# Patient Record
Sex: FEMALE | Race: BLACK OR AFRICAN AMERICAN | NOT HISPANIC OR LATINO | Employment: OTHER | URBAN - METROPOLITAN AREA
[De-identification: names, ages, dates, MRNs, and addresses within clinical notes are randomized per-mention and may not be internally consistent; named-entity substitution may affect disease eponyms.]

---

## 2018-05-15 ENCOUNTER — TELEPHONE (OUTPATIENT)
Dept: FAMILY MEDICINE | Facility: CLINIC | Age: 73
End: 2018-05-15

## 2018-05-15 ENCOUNTER — OFFICE VISIT (OUTPATIENT)
Dept: FAMILY MEDICINE | Facility: CLINIC | Age: 73
End: 2018-05-15
Payer: MEDICARE

## 2018-05-15 VITALS
OXYGEN SATURATION: 99 % | TEMPERATURE: 98 F | SYSTOLIC BLOOD PRESSURE: 110 MMHG | RESPIRATION RATE: 18 BRPM | HEIGHT: 63 IN | WEIGHT: 126.75 LBS | BODY MASS INDEX: 22.46 KG/M2 | HEART RATE: 84 BPM | DIASTOLIC BLOOD PRESSURE: 80 MMHG

## 2018-05-15 DIAGNOSIS — S80.00XA CONTUSION OF KNEE, UNSPECIFIED LATERALITY, INITIAL ENCOUNTER: ICD-10-CM

## 2018-05-15 DIAGNOSIS — S76.212A GROIN STRAIN, LEFT, INITIAL ENCOUNTER: ICD-10-CM
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
## 2018-05-15 PROCEDURE — 99999 PR PBB SHADOW E&M-NEW PATIENT-LVL III: CPT | Mod: PBBFAC,,, | Performed by: REGISTERED NURSE

## 2018-05-15 PROCEDURE — 99204 OFFICE O/P NEW MOD 45 MIN: CPT | Mod: S$GLB,,, | Performed by: REGISTERED NURSE

## 2018-05-15 RX ORDER — NEPAFENAC 3 MG/ML
SUSPENSION/ DROPS OPHTHALMIC
Refills: 1 | COMMUNITY
Start: 2018-05-08 | End: 2023-04-17

## 2018-05-15 RX ORDER — PREDNISOLONE ACETATE 10 MG/ML
SUSPENSION/ DROPS OPHTHALMIC
Refills: 1 | COMMUNITY
Start: 2018-05-08 | End: 2022-10-17

## 2018-05-15 RX ORDER — TIZANIDINE 2 MG/1
4 TABLET ORAL 2 TIMES DAILY PRN
Qty: 30 TABLET | Refills: 0 | Status: SHIPPED | OUTPATIENT
Start: 2018-05-15 | End: 2023-04-17

## 2018-05-15 RX ORDER — BESIFLOXACIN 6 MG/ML
SUSPENSION OPHTHALMIC
Refills: 1 | COMMUNITY
Start: 2018-05-08 | End: 2023-04-17

## 2018-05-15 NOTE — TELEPHONE ENCOUNTER
----- Message from Iqra Fraga sent at 5/15/2018  1:01 PM CDT -----  Contact: self [phone]  Would like to consult with nurse regarding medication; medication is not at pharmacy.  Please call back at [phone]. Md Sofia

## 2018-05-15 NOTE — PROGRESS NOTES
"Subjective:       Patient ID: Ezequiel Vegas is a 73 y.o. female.    Chief Complaint: Fall      HPI    Mrs. Vegas is a new patient to the office today, here with her sister and niece.  Reports a fall at home on Friday 5/11/2018 --- she was getting up, had slippers on which caused her to lose her balance on the wood floor.  She reports "my RT leg went one way and my LT another".  Landed doing something much like that of a split.  She was home alone and not able to get up; stayed on the floor until the next day when her family came to see about her b/c they were not able to get in touch w/ her by phone.  EMS was notified and called to her home, broke in and got her up off the floor.  She was taken to [facility] ED on Saturday 5/12/2018 where she was treated for her fall injury, along with elevated blood pressure likely due to anxiety and given IVF.  Labs and xray results reviewed in Epic.  Adacel vaccine given.  She is now with c/o LT groin strain likely due to the fall and knee pain, abrasion to knee.  Balance has been off for some time, uses walker at home.      Review of Systems   Constitutional: Positive for activity change. Negative for chills and fever.   Eyes: Negative.    Respiratory: Negative.    Cardiovascular: Negative.    Musculoskeletal: Positive for arthralgias (B knees), gait problem and myalgias (LT groin). Negative for back pain, joint swelling, neck pain and neck stiffness.   Skin: Positive for wound.   Neurological: Negative for dizziness, syncope, speech difficulty, light-headedness, numbness and headaches.   Psychiatric/Behavioral: Negative.          Past medical, surgical, family and social histories have been reviewed today.        Objective:       Vitals:    05/15/18 1022   BP: 110/80   Pulse: 84   Resp: 18   Temp: 98.2 °F (36.8 °C)   TempSrc: Oral   SpO2: 99%   Weight: 57.5 kg (126 lb 12.2 oz)   Height: 5' 3" (1.6 m)   PainSc:   8   PainLoc: Generalized       Physical Exam   Constitutional: She is " oriented to person, place, and time. She appears well-developed and well-nourished. No distress.   HENT:   Head: Normocephalic and atraumatic.   Eyes: EOM are normal. Pupils are equal, round, and reactive to light.   Neck: Normal range of motion. Neck supple.   Cardiovascular: Normal rate and regular rhythm.    Pulmonary/Chest: Effort normal and breath sounds normal.   Musculoskeletal: Normal range of motion.        Left hip: She exhibits tenderness. She exhibits normal strength, no swelling and no deformity.        Right knee: She exhibits bony tenderness. She exhibits normal range of motion, no swelling, no ecchymosis, normal alignment and normal patellar mobility. Tenderness found. Medial joint line tenderness noted.        Legs:  Neurological: She is alert and oriented to person, place, and time. No sensory deficit. She exhibits normal muscle tone. Coordination normal.   Skin: Skin is warm and dry. Capillary refill takes less than 2 seconds. Abrasion noted. She is not diaphoretic.        Psychiatric: She has a normal mood and affect. Her behavior is normal. Judgment and thought content normal.   Vitals reviewed.        Medication List with Changes/Refills   Current Medications    BESIVANCE 0.6 % DRPS    INSTILL ONE DROP IN OPERATIVE EYE FOUR TIMES DAILY STARTING THREE DAYS BEFORE SURGERY    ILEVRO 0.3 % DRPS    INSTILL ONE DROP INTO OPERATIVE EYE ONCE DAILY STARTING THREE DAYS BEFORE SURGERY    PREDNISOLONE ACETATE (PRED FORTE) 1 % DRPS    LOCATION: BOTH EYES. INSTILL ONE DROP IN OPERATIVE EYE FOUR TIMES DAILY STARTING AFTER SURGERY           Diagnosis       1. Fall from slipping, initial encounter    2. Contusion of knee, unspecified laterality, initial encounter    3. Groin strain, left, initial encounter          Assessment/ Plan     Fall from slipping, initial encounter        -     PT stable at this time, [facility] ED notes reviewed in Epic.  -     BATH/SHOWER CHAIR FOR HOME USE  -     Safety issues discussed  --- tub bar, anti-slip tub pads, apply tape to bottom of slippers, etc.  -     Family to look into getting PT a Life-Alert bracelet.    Contusion of knee, unspecified laterality, initial encounter        -     Ice, elevate, wound care discussed.    Groin strain, left, initial encounter        -     Ice/heat, rest, gentle ROM/stretching exercises.        -     Cautioned on possible sedation with muscle relaxer.        -     Can try topical pain creams or rubs.        -     Tylenol-XS prn pain.  -     tiZANidine (ZANAFLEX) 2 MG tablet; Take 2 tablets (4 mg total) by mouth 2 (two) times daily as needed.  Dispense: 30 tablet; Refill: 0      Follow-up in clinic as needed.          RIKI Urena  [facility]

## 2018-11-13 ENCOUNTER — PES CALL (OUTPATIENT)
Dept: ADMINISTRATIVE | Facility: CLINIC | Age: 73
End: 2018-11-13

## 2018-11-28 ENCOUNTER — PES CALL (OUTPATIENT)
Dept: ADMINISTRATIVE | Facility: CLINIC | Age: 73
End: 2018-11-28

## 2018-12-04 ENCOUNTER — PES CALL (OUTPATIENT)
Dept: ADMINISTRATIVE | Facility: CLINIC | Age: 73
End: 2018-12-04

## 2019-06-25 ENCOUNTER — OFFICE VISIT (OUTPATIENT)
Dept: FAMILY MEDICINE | Facility: CLINIC | Age: 74
End: 2019-06-25
Payer: MEDICARE

## 2019-06-25 VITALS
SYSTOLIC BLOOD PRESSURE: 119 MMHG | DIASTOLIC BLOOD PRESSURE: 74 MMHG | BODY MASS INDEX: 21.87 KG/M2 | TEMPERATURE: 98 F | HEART RATE: 75 BPM | OXYGEN SATURATION: 96 % | HEIGHT: 63 IN | WEIGHT: 123.44 LBS

## 2019-06-25 DIAGNOSIS — M25.50 PAIN IN JOINT INVOLVING MULTIPLE SITES: ICD-10-CM

## 2019-06-25 DIAGNOSIS — Z00.00 ENCOUNTER FOR MEDICARE ANNUAL WELLNESS EXAM: Primary | ICD-10-CM

## 2019-06-25 DIAGNOSIS — Z91.89 AT RISK FOR BONE DENSITY LOSS: ICD-10-CM

## 2019-06-25 DIAGNOSIS — H61.23 EXCESSIVE CERUMEN IN BOTH EAR CANALS: ICD-10-CM

## 2019-06-25 PROCEDURE — G0439 PPPS, SUBSEQ VISIT: HCPCS | Mod: S$GLB,,, | Performed by: REGISTERED NURSE

## 2019-06-25 PROCEDURE — G0439 PR MEDICARE ANNUAL WELLNESS SUBSEQUENT VISIT: ICD-10-PCS | Mod: S$GLB,,, | Performed by: REGISTERED NURSE

## 2019-06-25 PROCEDURE — 99999 PR PBB SHADOW E&M-EST. PATIENT-LVL III: CPT | Mod: PBBFAC,,, | Performed by: REGISTERED NURSE

## 2019-06-25 PROCEDURE — 99999 PR PBB SHADOW E&M-EST. PATIENT-LVL III: ICD-10-PCS | Mod: PBBFAC,,, | Performed by: REGISTERED NURSE

## 2019-06-25 NOTE — PROGRESS NOTES
Subjective:       Patient ID: Ezequiel Vegas is a 74 y.o. female.    Chief Complaint   Patient presents with    Annual Exam         HPI    Ezequiel Vegas is here today for an annual wellness exam.  I have reviewed the patient's medical history in detail and updated the computerized patient record.      Review of Systems   Constitutional: Negative.    HENT: Positive for ear pain. Negative for congestion, ear discharge, tinnitus, trouble swallowing and voice change.    Eyes: Negative.    Respiratory: Negative.    Cardiovascular: Negative for chest pain, palpitations and leg swelling.   Gastrointestinal: Negative.    Endocrine: Negative for polydipsia, polyphagia and polyuria.   Genitourinary: Negative.    Musculoskeletal: Positive for arthralgias (knees, fingers -- taking Motrin). Negative for back pain, gait problem, neck pain and neck stiffness.   Skin: Negative.    Neurological: Negative.    Hematological: Negative for adenopathy. Does not bruise/bleed easily.   Psychiatric/Behavioral: Positive for sleep disturbance (intermittent, OTC meds have not helped). Negative for decreased concentration and dysphoric mood. The patient is not nervous/anxious.          Review of patient's allergies indicates:   Allergen Reactions    Penicillins      Other reaction(s): Itching       Current Outpatient Medications on File Prior to Visit   Medication Sig Dispense Refill    BESIVANCE 0.6 % DrpS INSTILL ONE DROP IN OPERATIVE EYE FOUR TIMES DAILY STARTING THREE DAYS BEFORE SURGERY  1    ILEVRO 0.3 % DrpS INSTILL ONE DROP INTO OPERATIVE EYE ONCE DAILY STARTING THREE DAYS BEFORE SURGERY  1    prednisoLONE acetate (PRED FORTE) 1 % DrpS LOCATION: BOTH EYES. INSTILL ONE DROP IN OPERATIVE EYE FOUR TIMES DAILY STARTING AFTER SURGERY  1    tiZANidine (ZANAFLEX) 2 MG tablet Take 2 tablets (4 mg total) by mouth 2 (two) times daily as needed. 30 tablet 0     No current facility-administered medications on file prior to visit.   "      There is no problem list on file for this patient.      Past Medical History:   Diagnosis Date    Allergy     Cataract     Gout        Past Surgical History:   Procedure Laterality Date    CATARACT EXTRACTION Right 05/10/2018    HYSTERECTOMY      NEL/BSO    VARICOSE VEIN SURGERY         Family History   Problem Relation Age of Onset    Cervical cancer Mother     Glaucoma Mother     Esophageal cancer Mother     Hypertension Mother     No Known Problems Father     Prostate cancer Maternal Uncle     Lupus Other     Kidney disease Other     Stroke Neg Hx     Diabetes Neg Hx        Social History     Socioeconomic History    Marital status:     Number of children: 0   Tobacco Use    Smoking status: Light Tobacco Smoker    Smokeless tobacco: Never Used    Tobacco comment: occasionally   Substance and Sexual Activity    Alcohol use: Yes     Comment: occasional    Drug use: No    Sexual activity: Never             Objective:     Vitals:    06/25/19 1110   BP: 119/74   Pulse: 75   Temp: 97.7 °F (36.5 °C)   SpO2: 96%   Weight: 56 kg (123 lb 7.3 oz)   Height: 5' 3" (1.6 m)   PainSc: 0-No pain         Physical Exam   Constitutional: She is oriented to person, place, and time. She appears well-developed and well-nourished. No distress.   HENT:   Head: Normocephalic.   Right Ear: External ear normal. There is drainage (both canals occluded with wax).   Left Ear: External ear normal. There is drainage.   Nose: Nose normal.   Mouth/Throat: Oropharynx is clear and moist. No oropharyngeal exudate.   Eyes: Pupils are equal, round, and reactive to light. Conjunctivae and EOM are normal. Right eye exhibits no discharge. Left eye exhibits no discharge. No scleral icterus.   Neck: Normal range of motion. Neck supple. No JVD present. No tracheal deviation present. No thyromegaly present.   Cardiovascular: Normal rate, regular rhythm, normal heart sounds and intact distal pulses. Exam reveals no gallop " and no friction rub.   No murmur heard.  Pulmonary/Chest: Effort normal and breath sounds normal. No respiratory distress. She has no wheezes. She has no rales. She exhibits no tenderness.   Abdominal: Soft. Bowel sounds are normal. She exhibits no distension and no mass. There is no tenderness.   Musculoskeletal: Normal range of motion. She exhibits tenderness (finger joints//mild). She exhibits no edema or deformity.   Lymphadenopathy:     She has no cervical adenopathy.   Neurological: She is alert and oriented to person, place, and time. She displays normal reflexes. No cranial nerve deficit or sensory deficit. She exhibits normal muscle tone. Coordination normal.   Skin: Skin is warm and dry. Capillary refill takes less than 2 seconds. No rash noted. She is not diaphoretic.   Psychiatric: She has a normal mood and affect. Her behavior is normal. Judgment and thought content normal.         Diagnosis       1. Encounter for Medicare annual wellness exam    2. Pain in joint involving multiple sites    3. Excessive cerumen in both ear canals    4. At risk for bone density loss          Assessment/ Plan     Encounter for Medicare annual wellness exam  -     CBC auto differential; Future; Expected date: 06/25/2019  -     Comprehensive metabolic panel; Future; Expected date: 06/25/2019  -     TSH; Future; Expected date: 06/25/2019  -     Lipid panel; Future; Expected date: 06/25/2019    Pain in joint involving multiple sites  · With reports of intermittent joint pain, stiffness and swelling to knees and fingers, highly likely due to arthritis.  · Ice, elevate, exercises.  · OTC pain med prn.    Excessive cerumen in both ear canals  · Debrox prn.  · Avoid q-tips for ear cleaning.    At risk for bone density loss  -     DXA Bone Density Spine And Hip; Future; Expected date: 06/25/2019        Healthy diet, exercise.  Lab pending.  Follow-up in clinic as needed.          RIKI Urena  [facility]

## 2019-07-01 ENCOUNTER — LAB VISIT (OUTPATIENT)
Dept: LAB | Facility: HOSPITAL | Age: 74
End: 2019-07-01
Attending: REGISTERED NURSE
Payer: MEDICARE

## 2019-07-01 DIAGNOSIS — Z00.00 ENCOUNTER FOR MEDICARE ANNUAL WELLNESS EXAM: ICD-10-CM

## 2019-07-01 LAB
ALBUMIN SERPL BCP-MCNC: 3.6 G/DL (ref 3.5–5.2)
ALP SERPL-CCNC: 58 U/L (ref 55–135)
ALT SERPL W/O P-5'-P-CCNC: 11 U/L (ref 10–44)
ANION GAP SERPL CALC-SCNC: 12 MMOL/L (ref 8–16)
AST SERPL-CCNC: 17 U/L (ref 10–40)
BASOPHILS # BLD AUTO: 0.06 K/UL (ref 0–0.2)
BASOPHILS NFR BLD: 0.5 % (ref 0–1.9)
BILIRUB SERPL-MCNC: 0.8 MG/DL (ref 0.1–1)
BUN SERPL-MCNC: 20 MG/DL (ref 8–23)
CALCIUM SERPL-MCNC: 9.8 MG/DL (ref 8.7–10.5)
CHLORIDE SERPL-SCNC: 105 MMOL/L (ref 95–110)
CHOLEST SERPL-MCNC: 176 MG/DL (ref 120–199)
CHOLEST/HDLC SERPL: 2.5 {RATIO} (ref 2–5)
CO2 SERPL-SCNC: 25 MMOL/L (ref 23–29)
CREAT SERPL-MCNC: 1.2 MG/DL (ref 0.5–1.4)
DIFFERENTIAL METHOD: ABNORMAL
EOSINOPHIL # BLD AUTO: 0.3 K/UL (ref 0–0.5)
EOSINOPHIL NFR BLD: 2.9 % (ref 0–8)
ERYTHROCYTE [DISTWIDTH] IN BLOOD BY AUTOMATED COUNT: 14.7 % (ref 11.5–14.5)
EST. GFR  (AFRICAN AMERICAN): 50.7 ML/MIN/1.73 M^2
EST. GFR  (NON AFRICAN AMERICAN): 44 ML/MIN/1.73 M^2
GLUCOSE SERPL-MCNC: 78 MG/DL (ref 70–110)
HCT VFR BLD AUTO: 42.5 % (ref 37–48.5)
HDLC SERPL-MCNC: 70 MG/DL (ref 40–75)
HDLC SERPL: 39.8 % (ref 20–50)
HGB BLD-MCNC: 13.8 G/DL (ref 12–16)
IMM GRANULOCYTES # BLD AUTO: 0.03 K/UL (ref 0–0.04)
IMM GRANULOCYTES NFR BLD AUTO: 0.3 % (ref 0–0.5)
LDLC SERPL CALC-MCNC: 89.2 MG/DL (ref 63–159)
LYMPHOCYTES # BLD AUTO: 2.1 K/UL (ref 1–4.8)
LYMPHOCYTES NFR BLD: 18.3 % (ref 18–48)
MCH RBC QN AUTO: 31.1 PG (ref 27–31)
MCHC RBC AUTO-ENTMCNC: 32.5 G/DL (ref 32–36)
MCV RBC AUTO: 96 FL (ref 82–98)
MONOCYTES # BLD AUTO: 0.7 K/UL (ref 0.3–1)
MONOCYTES NFR BLD: 5.8 % (ref 4–15)
NEUTROPHILS # BLD AUTO: 8.2 K/UL (ref 1.8–7.7)
NEUTROPHILS NFR BLD: 72.2 % (ref 38–73)
NONHDLC SERPL-MCNC: 106 MG/DL
NRBC BLD-RTO: 0 /100 WBC
PLATELET # BLD AUTO: 241 K/UL (ref 150–350)
PMV BLD AUTO: 12.1 FL (ref 9.2–12.9)
POTASSIUM SERPL-SCNC: 4.1 MMOL/L (ref 3.5–5.1)
PROT SERPL-MCNC: 7.3 G/DL (ref 6–8.4)
RBC # BLD AUTO: 4.44 M/UL (ref 4–5.4)
SODIUM SERPL-SCNC: 142 MMOL/L (ref 136–145)
TRIGL SERPL-MCNC: 84 MG/DL (ref 30–150)
TSH SERPL DL<=0.005 MIU/L-ACNC: 1.93 UIU/ML (ref 0.4–4)
WBC # BLD AUTO: 11.29 K/UL (ref 3.9–12.7)

## 2019-07-01 PROCEDURE — 36415 COLL VENOUS BLD VENIPUNCTURE: CPT | Mod: PO

## 2019-07-01 PROCEDURE — 80053 COMPREHEN METABOLIC PANEL: CPT

## 2019-07-01 PROCEDURE — 84443 ASSAY THYROID STIM HORMONE: CPT

## 2019-07-01 PROCEDURE — 85025 COMPLETE CBC W/AUTO DIFF WBC: CPT

## 2019-07-01 PROCEDURE — 80061 LIPID PANEL: CPT

## 2019-07-08 ENCOUNTER — TELEPHONE (OUTPATIENT)
Dept: FAMILY MEDICINE | Facility: CLINIC | Age: 74
End: 2019-07-08

## 2019-07-08 DIAGNOSIS — N18.30 CKD (CHRONIC KIDNEY DISEASE) STAGE 3, GFR 30-59 ML/MIN: ICD-10-CM

## 2019-07-08 NOTE — TELEPHONE ENCOUNTER
Labs reviewed ---- everything looks fine/stable except for a slight decrease in her kidney function.  This can be seen with the normal aging process.  Will continue to monitor, recheck in 3 months.

## 2019-11-11 ENCOUNTER — PES CALL (OUTPATIENT)
Dept: ADMINISTRATIVE | Facility: CLINIC | Age: 74
End: 2019-11-11

## 2020-01-17 ENCOUNTER — PATIENT OUTREACH (OUTPATIENT)
Dept: ADMINISTRATIVE | Facility: HOSPITAL | Age: 75
End: 2020-01-17

## 2020-01-17 NOTE — PROGRESS NOTES
PreVisit Chart Audit Perfomed         Cristina CRAMER LPN Care Coordinator  Care Coordination Department  [facility]  [phone]

## 2020-01-31 ENCOUNTER — OFFICE VISIT (OUTPATIENT)
Dept: FAMILY MEDICINE | Facility: CLINIC | Age: 75
End: 2020-01-31
Payer: MEDICARE

## 2020-01-31 VITALS
TEMPERATURE: 98 F | WEIGHT: 124.56 LBS | HEIGHT: 63 IN | SYSTOLIC BLOOD PRESSURE: 140 MMHG | HEART RATE: 79 BPM | BODY MASS INDEX: 22.07 KG/M2 | DIASTOLIC BLOOD PRESSURE: 90 MMHG | OXYGEN SATURATION: 99 % | RESPIRATION RATE: 18 BRPM

## 2020-01-31 DIAGNOSIS — R03.0 ELEVATED BLOOD PRESSURE READING: ICD-10-CM

## 2020-01-31 DIAGNOSIS — G47.00 INSOMNIA, UNSPECIFIED TYPE: Primary | ICD-10-CM

## 2020-01-31 DIAGNOSIS — R53.83 FATIGUE, UNSPECIFIED TYPE: ICD-10-CM

## 2020-01-31 PROCEDURE — 1126F PR PAIN SEVERITY QUANTIFIED, NO PAIN PRESENT: ICD-10-PCS | Mod: S$GLB,,, | Performed by: REGISTERED NURSE

## 2020-01-31 PROCEDURE — 1159F PR MEDICATION LIST DOCUMENTED IN MEDICAL RECORD: ICD-10-PCS | Mod: S$GLB,,, | Performed by: REGISTERED NURSE

## 2020-01-31 PROCEDURE — 99999 PR PBB SHADOW E&M-EST. PATIENT-LVL III: CPT | Mod: PBBFAC,,, | Performed by: REGISTERED NURSE

## 2020-01-31 PROCEDURE — 90662 FLU VACCINE - HIGH DOSE (65+) PRESERVATIVE FREE IM: ICD-10-PCS | Mod: S$GLB,,, | Performed by: REGISTERED NURSE

## 2020-01-31 PROCEDURE — G0008 FLU VACCINE - HIGH DOSE (65+) PRESERVATIVE FREE IM: ICD-10-PCS | Mod: S$GLB,,, | Performed by: REGISTERED NURSE

## 2020-01-31 PROCEDURE — 1101F PT FALLS ASSESS-DOCD LE1/YR: CPT | Mod: S$GLB,,, | Performed by: REGISTERED NURSE

## 2020-01-31 PROCEDURE — 1159F MED LIST DOCD IN RCRD: CPT | Mod: S$GLB,,, | Performed by: REGISTERED NURSE

## 2020-01-31 PROCEDURE — 1126F AMNT PAIN NOTED NONE PRSNT: CPT | Mod: S$GLB,,, | Performed by: REGISTERED NURSE

## 2020-01-31 PROCEDURE — 99999 PR PBB SHADOW E&M-EST. PATIENT-LVL III: ICD-10-PCS | Mod: PBBFAC,,, | Performed by: REGISTERED NURSE

## 2020-01-31 PROCEDURE — 1101F PR PT FALLS ASSESS DOC 0-1 FALLS W/OUT INJ PAST YR: ICD-10-PCS | Mod: S$GLB,,, | Performed by: REGISTERED NURSE

## 2020-01-31 PROCEDURE — 90662 IIV NO PRSV INCREASED AG IM: CPT | Mod: S$GLB,,, | Performed by: REGISTERED NURSE

## 2020-01-31 PROCEDURE — 99213 OFFICE O/P EST LOW 20 MIN: CPT | Mod: 25,S$GLB,, | Performed by: REGISTERED NURSE

## 2020-01-31 PROCEDURE — G0008 ADMIN INFLUENZA VIRUS VAC: HCPCS | Mod: S$GLB,,, | Performed by: REGISTERED NURSE

## 2020-01-31 PROCEDURE — 99213 PR OFFICE/OUTPT VISIT, EST, LEVL III, 20-29 MIN: ICD-10-PCS | Mod: 25,S$GLB,, | Performed by: REGISTERED NURSE

## 2020-01-31 RX ORDER — TRAZODONE HYDROCHLORIDE 50 MG/1
25 TABLET ORAL NIGHTLY PRN
Qty: 30 TABLET | Refills: 1 | Status: SHIPPED | OUTPATIENT
Start: 2020-01-31 | End: 2022-10-13

## 2020-01-31 NOTE — PROGRESS NOTES
"Subjective:       Patient ID: Ezequiel Vegas is a 74 y.o. female.    Chief Complaint   Patient presents with    Fatigue    Insomnia       HPI    Ezequiel Vegas is here today with c/o increased fatigue likely due to her not sleeping.  She reports "[place] very nice neighborhood", neighbors are very noisy and disruptive.  This goes on at all hours of day and night.  She is afraid to notify anyone in case they retaliate.  She has tried taking OTC sleep medication but has not helped.  Does plan on moving soon.  Does fall asleep fine but tosses/turns all night due to the noise, wakes up frequently.        BP Readings from Last 3 Encounters:   01/31/20 (!) 144/92   06/25/19 119/74   05/15/18 110/80         Review of Systems   Constitutional: Positive for fatigue.   Respiratory: Negative.    Cardiovascular: Negative.    Neurological: Negative.    Psychiatric/Behavioral: Positive for sleep disturbance.         Review of patient's allergies indicates:   Allergen Reactions    Penicillins      Other reaction(s): Itching         Patient Active Problem List   Diagnosis    CKD (chronic kidney disease) stage 3, GFR 30-59 ml/min       Medication List with Changes/Refills   Current Medications    BESIVANCE 0.6 % DRPS    INSTILL ONE DROP IN OPERATIVE EYE FOUR TIMES DAILY STARTING THREE DAYS BEFORE SURGERY    ILEVRO 0.3 % DRPS    INSTILL ONE DROP INTO OPERATIVE EYE ONCE DAILY STARTING THREE DAYS BEFORE SURGERY    PREDNISOLONE ACETATE (PRED FORTE) 1 % DRPS    LOCATION: BOTH EYES. INSTILL ONE DROP IN OPERATIVE EYE FOUR TIMES DAILY STARTING AFTER SURGERY    TIZANIDINE (ZANAFLEX) 2 MG TABLET    Take 2 tablets (4 mg total) by mouth 2 (two) times daily as needed.             Past medical, surgical, family and social histories have been reviewed today.        Objective:     Vitals:    01/31/20 0939   BP: (!) 144/92   Pulse: 79   Resp: 18   Temp: 97.9 °F (36.6 °C)   TempSrc: Oral   SpO2: 99%   Weight: 56.5 kg (124 lb 9 oz) " "  Height: 5' 3" (1.6 m)   PainSc: 0-No pain       Estimated body mass index is 22.06 kg/m² as calculated from the following:    Height as of this encounter: 5' 3" (1.6 m).    Weight as of this encounter: 56.5 kg (124 lb 9 oz).      Physical Exam   Constitutional: She is oriented to person, place, and time. She appears well-developed and well-nourished.   Cardiovascular: Normal rate and regular rhythm.   Pulmonary/Chest: Effort normal and breath sounds normal.   Neurological: She is alert and oriented to person, place, and time.   Vitals reviewed.        Diagnosis       1. Insomnia, unspecified type    2. Fatigue, unspecified type    3. Elevated blood pressure reading          Assessment/ Plan     Insomnia, unspecified type  -     traZODone (DESYREL) 50 MG tablet; Take 0.5 tablets (25 mg total) by mouth nightly as needed for Insomnia.  Dispense: 30 tablet; Refill: 1    Fatigue, unspecified type  Likely a direct result of interrupted sleep.    Elevated blood pressure reading  Possibly due to lack of sleep, no h/o HTN.  Return in 1 to 2 weeks with nurse to monitor.    Other orders  -     Influenza - High Dose (65+) (PF) (IM)        Flu shot today.  Medication discussed.  Follow-up in clinic as needed.      Patient Care Team:  Primary [facility]   "

## 2020-10-09 ENCOUNTER — PATIENT OUTREACH (OUTPATIENT)
Dept: ADMINISTRATIVE | Facility: HOSPITAL | Age: 75
End: 2020-10-09

## 2020-10-09 NOTE — PROGRESS NOTES
HTN REPORT: Attempting to contact pt to schedule Annual F/U. Unable to reach patient at this time. Left voicemail.

## 2020-11-19 ENCOUNTER — PES CALL (OUTPATIENT)
Dept: ADMINISTRATIVE | Facility: CLINIC | Age: 75
End: 2020-11-19

## 2020-11-20 ENCOUNTER — IMMUNIZATION (OUTPATIENT)
Dept: FAMILY MEDICINE | Facility: CLINIC | Age: 75
End: 2020-11-20
Payer: MEDICARE

## 2020-11-20 PROCEDURE — G0008 ADMIN INFLUENZA VIRUS VAC: HCPCS | Mod: S$GLB,,, | Performed by: FAMILY MEDICINE

## 2020-11-20 PROCEDURE — G0008 FLU VACCINE - QUADRIVALENT - ADJUVANTED: ICD-10-PCS | Mod: S$GLB,,, | Performed by: FAMILY MEDICINE

## 2020-11-20 PROCEDURE — 90694 VACC AIIV4 NO PRSRV 0.5ML IM: CPT | Mod: S$GLB,,, | Performed by: FAMILY MEDICINE

## 2020-11-20 PROCEDURE — 90694 FLU VACCINE - QUADRIVALENT - ADJUVANTED: ICD-10-PCS | Mod: S$GLB,,, | Performed by: FAMILY MEDICINE

## 2020-11-23 ENCOUNTER — PES CALL (OUTPATIENT)
Dept: ADMINISTRATIVE | Facility: CLINIC | Age: 75
End: 2020-11-23

## 2020-12-10 NOTE — PROGRESS NOTES
Subjective:       Patient ID: Ezequiel Vegas is a 75 y.o. female.    Chief Complaint   Patient presents with    Annual Exam         HPI    Ezequiel Vegas is here today for an annual wellness exam.  I have reviewed the patient's medical history in detail and updated the computerized patient record.      HEALTH MAINTENANCE:  [x]  Mammogram: overdue  [x]  Colonoscopy:  Unclear as to date last done and when next one due  []  Pap smear: Hysterectomy  []  DEXA: > 3 yrs, declines  [x]  Eye exam:  2019  []  Foot exam: n/a  [x]  Labwork: [x]  Lipids: 7/1/19  [x]  A1C: never  [x]  HIV: never  [x]  Hepatitis-C: never  [x]  Vaccines: []  Tetanus: 5/12/18  [x]  Shingles: to pharmacy  [x]  Pneumonia: never  []  Influenza: 11/20/20      LIFESTYLE:  Diet:  []  Unhealthy overall  []  Vegan/plant-based  []  Pescatarian  [x]  Well-balanced  []  High in fat and cholesterol  []  Increased carbs/starches  []  Fiber    Exercise: [x]  None   []  Type:  []  Minutes:  []  Distance:  []  Days per week:    Water:  []  None  []  Minimal  [x]  Adequate  []  Ounces/day    Salt:  [x]  None  []  Low  []  Cooks with  []  Adds at table  []  Increased overall    Caffeine: []  None  [x]  Coffee  []  Tea  [x]  Soft drinks    Smoking: []  None    []  Former smoker    [x]  PPD: 1/4 ppd    Alcohol: []  None    [x]  Yes --- ana [place] 1 to 2 times/week    Weight: []  Stable    []  Gain    [x]  Loss --- 11 lbs since 1/2020, decreased appetite    Stress:  [x]  None reported    []  Mild    []  Moderate    []  Severe        Review of Systems   Constitutional: Negative for activity change, appetite change, chills, diaphoresis, fatigue, fever and unexpected weight change.   HENT: Negative for nasal congestion, mouth sores and tinnitus.         Reports ear pain/pressure   Eyes: Negative for discharge and visual disturbance.   Respiratory: Negative for cough, shortness of breath and wheezing.    Cardiovascular: Negative for chest pain, palpitations  and leg swelling.   Gastrointestinal: Positive for constipation (BM about 1 to 2 times/week, lots of water reported). Negative for abdominal pain, bloating, blood in stool, diarrhea, nausea, vomiting and reflux.   Endocrine: Negative.    Genitourinary: Negative.    Musculoskeletal: Positive for arthralgias.   Integumentary:  Negative for rash. Negative.   Neurological: Negative for vertigo, seizures, syncope, numbness and headaches.   Hematological: Negative.    Psychiatric/Behavioral: Negative for depression and sleep disturbance. The patient is not nervous/anxious.    Breast: negative.           Review of patient's allergies indicates:   Allergen Reactions    Penicillins      Other reaction(s): Itching         Patient Active Problem List   Diagnosis    CKD (chronic kidney disease) stage 3, GFR 30-59 ml/min           Current Outpatient Medications:     BESIVANCE 0.6 % DrpS, INSTILL ONE DROP IN OPERATIVE EYE FOUR TIMES DAILY STARTING THREE DAYS BEFORE SURGERY, Disp: , Rfl: 1    ILEVRO 0.3 % DrpS, INSTILL ONE DROP INTO OPERATIVE EYE ONCE DAILY STARTING THREE DAYS BEFORE SURGERY, Disp: , Rfl: 1    prednisoLONE acetate (PRED FORTE) 1 % DrpS, LOCATION: BOTH EYES. INSTILL ONE DROP IN OPERATIVE EYE FOUR TIMES DAILY STARTING AFTER SURGERY, Disp: , Rfl: 1    tiZANidine (ZANAFLEX) 2 MG tablet, Take 2 tablets (4 mg total) by mouth 2 (two) times daily as needed., Disp: 30 tablet, Rfl: 0    traZODone (DESYREL) 50 MG tablet, Take 0.5 tablets (25 mg total) by mouth nightly as needed for Insomnia., Disp: 30 tablet, Rfl: 1      Past Medical History:   Diagnosis Date    Allergy     Cataract     Gout          Past Surgical History:   Procedure Laterality Date    CATARACT EXTRACTION Right 05/10/2018    HYSTERECTOMY      NEL/BSO    VARICOSE VEIN SURGERY           Family History   Problem Relation Age of Onset    Cervical cancer Mother     Glaucoma Mother     Esophageal cancer Mother     Hypertension Mother     No  "Known Problems Father     Prostate cancer Maternal Uncle     Lupus Other     Kidney disease Other     Stroke Neg Hx     Diabetes Neg Hx          Social History     Tobacco Use    Smoking status: Light Tobacco Smoker    Smokeless tobacco: Never Used    Tobacco comment: occasionally   Substance Use Topics    Alcohol use: Yes     Comment: occasional    Drug use: No           Objective:     Vitals:    12/11/20 1013   BP: 134/84   BP Location: Right arm   Patient Position: Sitting   BP Method: Medium (Manual)   Pulse: 65   Temp: 97.6 °F (36.4 °C)   TempSrc: Temporal   SpO2: 100%   Weight: 51.3 kg (113 lb 1.5 oz)   Height: 5' 3" (1.6 m)       Estimated body mass index is 22.06 kg/m² as calculated from the following:    Height as of 1/31/20: 5' 3" (1.6 m).    Weight as of 1/31/20: 56.5 kg (124 lb 9 oz).      Physical Exam  Constitutional:       General: She is not in acute distress.     Appearance: She is well-developed. She is not diaphoretic.   HENT:      Head: Normocephalic and atraumatic.      Right Ear: Tympanic membrane, ear canal and external ear normal. There is no impacted cerumen.      Left Ear: Tympanic membrane, ear canal and external ear normal. There is no impacted cerumen.      Nose: Nose normal. No nasal deformity, mucosal edema, congestion, rhinorrhea or epistaxis.      Mouth/Throat:      Mouth: Mucous membranes are moist. Mucous membranes are not dry and not cyanotic. No oral lesions.      Dentition: Normal dentition.      Pharynx: Oropharynx is clear. Uvula midline. No oropharyngeal exudate, posterior oropharyngeal erythema or uvula swelling.      Tonsils: No tonsillar abscesses.   Eyes:      General: Lids are normal. Lids are everted, no foreign bodies appreciated. No scleral icterus.        Right eye: No discharge.         Left eye: No discharge.      Conjunctiva/sclera: Conjunctivae normal.      Right eye: Right conjunctiva is not injected. No exudate.     Left eye: Left conjunctiva is not " injected. No exudate.     Pupils: Pupils are equal, round, and reactive to light.   Neck:      Musculoskeletal: Normal range of motion and neck supple. Normal range of motion. No edema or erythema.      Thyroid: No thyroid mass or thyromegaly.      Vascular: No carotid bruit or JVD.      Trachea: No tracheal deviation.   Cardiovascular:      Rate and Rhythm: Normal rate and regular rhythm.      Pulses: Normal pulses.      Heart sounds: Normal heart sounds. No murmur. No friction rub. No gallop.    Pulmonary:      Effort: Pulmonary effort is normal. No respiratory distress.      Breath sounds: Normal breath sounds. No stridor. No wheezing.   Chest:      Chest wall: No tenderness.   Abdominal:      General: Bowel sounds are normal. There is no distension.      Palpations: Abdomen is soft. There is no mass.      Tenderness: There is no abdominal tenderness. There is no guarding or rebound.   Musculoskeletal: Normal range of motion.         General: No swelling, tenderness or deformity.   Lymphadenopathy:      Cervical: No cervical adenopathy.   Skin:     General: Skin is warm and dry.      Capillary Refill: Capillary refill takes less than 2 seconds.      Coloration: Skin is not pale.      Findings: No bruising, erythema or rash.   Neurological:      Mental Status: She is alert and oriented to person, place, and time.      Cranial Nerves: No cranial nerve deficit.      Sensory: No sensory deficit.      Motor: No weakness, tremor, atrophy or abnormal muscle tone.      Coordination: Coordination normal.      Gait: Gait normal.      Deep Tendon Reflexes: Reflexes are normal and symmetric.   Psychiatric:         Mood and Affect: Mood normal.         Speech: Speech normal.         Behavior: Behavior normal.         Thought Content: Thought content normal.         Cognition and Memory: Memory is not impaired.         Judgment: Judgment normal.           Diagnosis       1. Encounter for Medicare annual wellness exam    2.  Stage 3 chronic kidney disease, unspecified whether stage 3a or 3b CKD    3. Bilateral impacted cerumen    4. Colon cancer screening    5. Encounter for screening for malignant neoplasm of breast, unspecified screening modality    6. Need for pneumococcal vaccine          Assessment/ Plan     Encounter for Medicare annual wellness exam --- HCM discussed.  -     CBC Auto Differential; Future; Expected date: 12/11/2020  -     Comprehensive Metabolic Panel; Future; Expected date: 12/11/2020  -     TSH; Future; Expected date: 12/11/2020  -     Lipid Panel; Future; Expected date: 12/11/2020  -     Hemoglobin A1C; Future; Expected date: 12/11/2020  -     HIV 1/2 Ag/Ab (4th Gen); Future; Expected date: 12/11/2020  -     Hepatitis C Antibody; Future; Expected date: 12/11/2020  -     Mammo Digital Screening Bilat w/ Adria; Future; Expected date: 12/11/2020  -     Pneumococcal Conjugate Vaccine (13 Valent) (IM)  -     Fecal Immunochemical Test (iFOBT); Future; Expected date: 12/11/2020    Stage 3 chronic kidney disease, unspecified whether stage 3a or 3b CKD  -     CBC Auto Differential; Future; Expected date: 12/11/2020  -     Comprehensive Metabolic Panel; Future; Expected date: 12/11/2020    Bilateral impacted cerumen  --- Wax is removed by syringing and manual debridement. Instructions for home care to prevent wax buildup are given.  Debrox prn.  TM visualized, normal.    Colon cancer screening --- if test results negative, then can likely stop screening due to age.  -     Fecal Immunochemical Test (iFOBT); Future; Expected date: 12/11/2020    Encounter for screening for malignant neoplasm of breast, unspecified screening modality  -     Mammo Digital Screening Bilat w/ Adria; Future; Expected date: 12/11/2020    Need for pneumococcal vaccine --- Pneumovax due 1 yr  -     Pneumococcal Conjugate Vaccine (13 Valent) (IM)          FOLLOW-UP:  Return prn.      Patient Care Team:  Primary Doctor No as PCP - General Jenkins  RIKI Craig  [facility]

## 2020-12-11 ENCOUNTER — LAB VISIT (OUTPATIENT)
Dept: LAB | Facility: HOSPITAL | Age: 75
End: 2020-12-11
Attending: REGISTERED NURSE
Payer: MEDICARE

## 2020-12-11 ENCOUNTER — OFFICE VISIT (OUTPATIENT)
Dept: FAMILY MEDICINE | Facility: CLINIC | Age: 75
End: 2020-12-11
Payer: MEDICARE

## 2020-12-11 VITALS
TEMPERATURE: 98 F | SYSTOLIC BLOOD PRESSURE: 134 MMHG | DIASTOLIC BLOOD PRESSURE: 84 MMHG | BODY MASS INDEX: 20.04 KG/M2 | HEIGHT: 63 IN | OXYGEN SATURATION: 100 % | WEIGHT: 113.13 LBS | HEART RATE: 65 BPM

## 2020-12-11 DIAGNOSIS — H61.23 BILATERAL IMPACTED CERUMEN: ICD-10-CM

## 2020-12-11 DIAGNOSIS — N18.30 STAGE 3 CHRONIC KIDNEY DISEASE, UNSPECIFIED WHETHER STAGE 3A OR 3B CKD: ICD-10-CM

## 2020-12-11 DIAGNOSIS — Z12.39 ENCOUNTER FOR SCREENING FOR MALIGNANT NEOPLASM OF BREAST, UNSPECIFIED SCREENING MODALITY: ICD-10-CM

## 2020-12-11 DIAGNOSIS — Z23 NEED FOR PNEUMOCOCCAL VACCINE: ICD-10-CM

## 2020-12-11 DIAGNOSIS — Z12.11 COLON CANCER SCREENING: ICD-10-CM

## 2020-12-11 DIAGNOSIS — Z00.00 ENCOUNTER FOR MEDICARE ANNUAL WELLNESS EXAM: ICD-10-CM

## 2020-12-11 DIAGNOSIS — Z00.00 ENCOUNTER FOR MEDICARE ANNUAL WELLNESS EXAM: Primary | ICD-10-CM

## 2020-12-11 LAB
ALBUMIN SERPL BCP-MCNC: 4 G/DL (ref 3.5–5.2)
ALP SERPL-CCNC: 59 U/L (ref 55–135)
ALT SERPL W/O P-5'-P-CCNC: 12 U/L (ref 10–44)
ANION GAP SERPL CALC-SCNC: 11 MMOL/L (ref 8–16)
AST SERPL-CCNC: 21 U/L (ref 10–40)
BASOPHILS # BLD AUTO: 0.04 K/UL (ref 0–0.2)
BASOPHILS NFR BLD: 0.4 % (ref 0–1.9)
BILIRUB SERPL-MCNC: 0.6 MG/DL (ref 0.1–1)
BUN SERPL-MCNC: 19 MG/DL (ref 8–23)
CALCIUM SERPL-MCNC: 9.5 MG/DL (ref 8.7–10.5)
CHLORIDE SERPL-SCNC: 104 MMOL/L (ref 95–110)
CHOLEST SERPL-MCNC: 225 MG/DL (ref 120–199)
CHOLEST/HDLC SERPL: 3 {RATIO} (ref 2–5)
CO2 SERPL-SCNC: 27 MMOL/L (ref 23–29)
CREAT SERPL-MCNC: 1.2 MG/DL (ref 0.5–1.4)
DIFFERENTIAL METHOD: ABNORMAL
EOSINOPHIL # BLD AUTO: 0.2 K/UL (ref 0–0.5)
EOSINOPHIL NFR BLD: 1.8 % (ref 0–8)
ERYTHROCYTE [DISTWIDTH] IN BLOOD BY AUTOMATED COUNT: 15.5 % (ref 11.5–14.5)
EST. GFR  (AFRICAN AMERICAN): 50.4 ML/MIN/1.73 M^2
EST. GFR  (NON AFRICAN AMERICAN): 43.7 ML/MIN/1.73 M^2
ESTIMATED AVG GLUCOSE: 103 MG/DL (ref 68–131)
GLUCOSE SERPL-MCNC: 84 MG/DL (ref 70–110)
HBA1C MFR BLD HPLC: 5.2 % (ref 4–5.6)
HCT VFR BLD AUTO: 42.1 % (ref 37–48.5)
HDLC SERPL-MCNC: 76 MG/DL (ref 40–75)
HDLC SERPL: 33.8 % (ref 20–50)
HGB BLD-MCNC: 12.9 G/DL (ref 12–16)
IMM GRANULOCYTES # BLD AUTO: 0.03 K/UL (ref 0–0.04)
IMM GRANULOCYTES NFR BLD AUTO: 0.3 % (ref 0–0.5)
LDLC SERPL CALC-MCNC: 131 MG/DL (ref 63–159)
LYMPHOCYTES # BLD AUTO: 2 K/UL (ref 1–4.8)
LYMPHOCYTES NFR BLD: 20 % (ref 18–48)
MCH RBC QN AUTO: 28.9 PG (ref 27–31)
MCHC RBC AUTO-ENTMCNC: 30.6 G/DL (ref 32–36)
MCV RBC AUTO: 94 FL (ref 82–98)
MONOCYTES # BLD AUTO: 0.5 K/UL (ref 0.3–1)
MONOCYTES NFR BLD: 5.2 % (ref 4–15)
NEUTROPHILS # BLD AUTO: 7.3 K/UL (ref 1.8–7.7)
NEUTROPHILS NFR BLD: 72.3 % (ref 38–73)
NONHDLC SERPL-MCNC: 149 MG/DL
NRBC BLD-RTO: 0 /100 WBC
PLATELET # BLD AUTO: 253 K/UL (ref 150–350)
PMV BLD AUTO: 12.1 FL (ref 9.2–12.9)
POTASSIUM SERPL-SCNC: 4 MMOL/L (ref 3.5–5.1)
PROT SERPL-MCNC: 7.7 G/DL (ref 6–8.4)
RBC # BLD AUTO: 4.46 M/UL (ref 4–5.4)
SODIUM SERPL-SCNC: 142 MMOL/L (ref 136–145)
TRIGL SERPL-MCNC: 90 MG/DL (ref 30–150)
TSH SERPL DL<=0.005 MIU/L-ACNC: 1.02 UIU/ML (ref 0.4–4)
WBC # BLD AUTO: 10.13 K/UL (ref 3.9–12.7)

## 2020-12-11 PROCEDURE — 80061 LIPID PANEL: CPT

## 2020-12-11 PROCEDURE — 90670 PCV13 VACCINE IM: CPT | Mod: S$GLB,,, | Performed by: REGISTERED NURSE

## 2020-12-11 PROCEDURE — 85025 COMPLETE CBC W/AUTO DIFF WBC: CPT

## 2020-12-11 PROCEDURE — 1101F PT FALLS ASSESS-DOCD LE1/YR: CPT | Mod: S$GLB,,, | Performed by: REGISTERED NURSE

## 2020-12-11 PROCEDURE — 86703 HIV-1/HIV-2 1 RESULT ANTBDY: CPT

## 2020-12-11 PROCEDURE — G0009 PNEUMOCOCCAL CONJUGATE VACCINE 13-VALENT LESS THAN 5YO & GREATER THAN: ICD-10-PCS | Mod: S$GLB,,, | Performed by: REGISTERED NURSE

## 2020-12-11 PROCEDURE — 1126F PR PAIN SEVERITY QUANTIFIED, NO PAIN PRESENT: ICD-10-PCS | Mod: S$GLB,,, | Performed by: REGISTERED NURSE

## 2020-12-11 PROCEDURE — 99999 PR PBB SHADOW E&M-EST. PATIENT-LVL IV: ICD-10-PCS | Mod: PBBFAC,,, | Performed by: REGISTERED NURSE

## 2020-12-11 PROCEDURE — 80053 COMPREHEN METABOLIC PANEL: CPT

## 2020-12-11 PROCEDURE — 3288F FALL RISK ASSESSMENT DOCD: CPT | Mod: S$GLB,,, | Performed by: REGISTERED NURSE

## 2020-12-11 PROCEDURE — 36415 COLL VENOUS BLD VENIPUNCTURE: CPT | Mod: PO

## 2020-12-11 PROCEDURE — 84443 ASSAY THYROID STIM HORMONE: CPT

## 2020-12-11 PROCEDURE — 86803 HEPATITIS C AB TEST: CPT

## 2020-12-11 PROCEDURE — 83036 HEMOGLOBIN GLYCOSYLATED A1C: CPT

## 2020-12-11 PROCEDURE — 3288F PR FALLS RISK ASSESSMENT DOCUMENTED: ICD-10-PCS | Mod: S$GLB,,, | Performed by: REGISTERED NURSE

## 2020-12-11 PROCEDURE — 1101F PR PT FALLS ASSESS DOC 0-1 FALLS W/OUT INJ PAST YR: ICD-10-PCS | Mod: S$GLB,,, | Performed by: REGISTERED NURSE

## 2020-12-11 PROCEDURE — 90670 PNEUMOCOCCAL CONJUGATE VACCINE 13-VALENT LESS THAN 5YO & GREATER THAN: ICD-10-PCS | Mod: S$GLB,,, | Performed by: REGISTERED NURSE

## 2020-12-11 PROCEDURE — G0009 ADMIN PNEUMOCOCCAL VACCINE: HCPCS | Mod: S$GLB,,, | Performed by: REGISTERED NURSE

## 2020-12-11 PROCEDURE — 99999 PR PBB SHADOW E&M-EST. PATIENT-LVL IV: CPT | Mod: PBBFAC,,, | Performed by: REGISTERED NURSE

## 2020-12-11 PROCEDURE — 99397 PER PM REEVAL EST PAT 65+ YR: CPT | Mod: 25,S$GLB,, | Performed by: REGISTERED NURSE

## 2020-12-11 PROCEDURE — 1126F AMNT PAIN NOTED NONE PRSNT: CPT | Mod: S$GLB,,, | Performed by: REGISTERED NURSE

## 2020-12-11 PROCEDURE — 99397 PR PREVENTIVE VISIT,EST,65 & OVER: ICD-10-PCS | Mod: 25,S$GLB,, | Performed by: REGISTERED NURSE

## 2020-12-14 LAB
HCV AB SERPL QL IA: NEGATIVE
HIV 1+2 AB+HIV1 P24 AG SERPL QL IA: NEGATIVE

## 2020-12-15 ENCOUNTER — TELEPHONE (OUTPATIENT)
Dept: FAMILY MEDICINE | Facility: CLINIC | Age: 75
End: 2020-12-15

## 2020-12-15 NOTE — TELEPHONE ENCOUNTER
----- Message from Mary Bledsoe sent at 12/15/2020 10:12 AM CST -----  Contact: Ezequiel  Type:  Patient Returning Call    Who Called:Ezequiel  Who Left Message for Patient:Kiara nurse  Does the patient know what this is regarding?:results  Would the patient rather a call back or a response via [email or web address]? Call back  Best Call Back Number:[phone]  Additional Information: n/a    Thanks  KT

## 2021-01-26 ENCOUNTER — TELEPHONE (OUTPATIENT)
Dept: FAMILY MEDICINE | Facility: CLINIC | Age: 76
End: 2021-01-26

## 2021-02-08 ENCOUNTER — HOSPITAL ENCOUNTER (OUTPATIENT)
Dept: RADIOLOGY | Facility: HOSPITAL | Age: 76
Discharge: HOME | End: 2021-02-08
Attending: INTERNAL MEDICINE
Payer: COMMERCIAL

## 2021-02-08 ENCOUNTER — OFFICE VISIT (OUTPATIENT)
Dept: FAMILY MEDICINE | Facility: CLINIC | Age: 76
End: 2021-02-08
Payer: COMMERCIAL

## 2021-02-08 VITALS
SYSTOLIC BLOOD PRESSURE: 134 MMHG | OXYGEN SATURATION: 100 % | RESPIRATION RATE: 18 BRPM | HEIGHT: 63 IN | WEIGHT: 111 LBS | BODY MASS INDEX: 19.67 KG/M2 | HEART RATE: 82 BPM | DIASTOLIC BLOOD PRESSURE: 86 MMHG | TEMPERATURE: 97 F

## 2021-02-08 DIAGNOSIS — N18.30 STAGE 3 CHRONIC KIDNEY DISEASE, UNSPECIFIED WHETHER STAGE 3A OR 3B CKD: Primary | ICD-10-CM

## 2021-02-08 DIAGNOSIS — R41.3 MEMORY CHANGES: ICD-10-CM

## 2021-02-08 DIAGNOSIS — R63.4 WEIGHT LOSS: ICD-10-CM

## 2021-02-08 DIAGNOSIS — Z72.0 TOBACCO USE: ICD-10-CM

## 2021-02-08 DIAGNOSIS — E78.5 DYSLIPIDEMIA: ICD-10-CM

## 2021-02-08 PROCEDURE — 99203 PR OFFICE/OUTPT VISIT, NEW, LEVL III, 30-44 MIN: ICD-10-PCS | Mod: S$GLB,,, | Performed by: INTERNAL MEDICINE

## 2021-02-08 PROCEDURE — 1101F PR PT FALLS ASSESS DOC 0-1 FALLS W/OUT INJ PAST YR: ICD-10-PCS | Mod: CPTII,S$GLB,, | Performed by: INTERNAL MEDICINE

## 2021-02-08 PROCEDURE — 71046 X-RAY EXAM CHEST 2 VIEWS: CPT | Mod: 26,,, | Performed by: RADIOLOGY

## 2021-02-08 PROCEDURE — 1159F PR MEDICATION LIST DOCUMENTED IN MEDICAL RECORD: ICD-10-PCS | Mod: S$GLB,,, | Performed by: INTERNAL MEDICINE

## 2021-02-08 PROCEDURE — 71046 X-RAY EXAM CHEST 2 VIEWS: CPT | Mod: TC,FY,PO

## 2021-02-08 PROCEDURE — 1159F MED LIST DOCD IN RCRD: CPT | Mod: S$GLB,,, | Performed by: INTERNAL MEDICINE

## 2021-02-08 PROCEDURE — 71046 XR CHEST PA AND LATERAL: ICD-10-PCS | Mod: 26,,, | Performed by: RADIOLOGY

## 2021-02-08 PROCEDURE — 99999 PR PBB SHADOW E&M-EST. PATIENT-LVL IV: CPT | Mod: PBBFAC,,, | Performed by: INTERNAL MEDICINE

## 2021-02-08 PROCEDURE — 1101F PT FALLS ASSESS-DOCD LE1/YR: CPT | Mod: CPTII,S$GLB,, | Performed by: INTERNAL MEDICINE

## 2021-02-08 PROCEDURE — 1126F PR PAIN SEVERITY QUANTIFIED, NO PAIN PRESENT: ICD-10-PCS | Mod: S$GLB,,, | Performed by: INTERNAL MEDICINE

## 2021-02-08 PROCEDURE — 3288F FALL RISK ASSESSMENT DOCD: CPT | Mod: CPTII,S$GLB,, | Performed by: INTERNAL MEDICINE

## 2021-02-08 PROCEDURE — 99999 PR PBB SHADOW E&M-EST. PATIENT-LVL IV: ICD-10-PCS | Mod: PBBFAC,,, | Performed by: INTERNAL MEDICINE

## 2021-02-08 PROCEDURE — 3288F PR FALLS RISK ASSESSMENT DOCUMENTED: ICD-10-PCS | Mod: CPTII,S$GLB,, | Performed by: INTERNAL MEDICINE

## 2021-02-08 PROCEDURE — 99203 OFFICE O/P NEW LOW 30 MIN: CPT | Mod: S$GLB,,, | Performed by: INTERNAL MEDICINE

## 2021-02-08 PROCEDURE — 1126F AMNT PAIN NOTED NONE PRSNT: CPT | Mod: S$GLB,,, | Performed by: INTERNAL MEDICINE

## 2021-02-12 ENCOUNTER — HOSPITAL ENCOUNTER (OUTPATIENT)
Dept: RADIOLOGY | Facility: HOSPITAL | Age: 76
Discharge: HOME | End: 2021-02-12
Attending: REGISTERED NURSE
Payer: COMMERCIAL

## 2021-02-12 VITALS — WEIGHT: 111.13 LBS | BODY MASS INDEX: 19.69 KG/M2 | HEIGHT: 63 IN

## 2021-02-12 DIAGNOSIS — Z12.39 ENCOUNTER FOR SCREENING FOR MALIGNANT NEOPLASM OF BREAST, UNSPECIFIED SCREENING MODALITY: ICD-10-CM

## 2021-02-12 DIAGNOSIS — Z00.00 ENCOUNTER FOR MEDICARE ANNUAL WELLNESS EXAM: ICD-10-CM

## 2021-02-12 PROCEDURE — 77067 SCR MAMMO BI INCL CAD: CPT | Mod: 26,,, | Performed by: RADIOLOGY

## 2021-02-12 PROCEDURE — 77067 MAMMO DIGITAL SCREENING BILAT WITH TOMO: ICD-10-PCS | Mod: 26,,, | Performed by: RADIOLOGY

## 2021-02-12 PROCEDURE — 77067 SCR MAMMO BI INCL CAD: CPT | Mod: TC

## 2021-02-12 PROCEDURE — 77063 BREAST TOMOSYNTHESIS BI: CPT | Mod: 26,,, | Performed by: RADIOLOGY

## 2021-02-12 PROCEDURE — 77063 MAMMO DIGITAL SCREENING BILAT WITH TOMO: ICD-10-PCS | Mod: 26,,, | Performed by: RADIOLOGY

## 2021-03-02 ENCOUNTER — TELEPHONE (OUTPATIENT)
Dept: FAMILY MEDICINE | Facility: CLINIC | Age: 76
End: 2021-03-02

## 2021-03-22 ENCOUNTER — LAB VISIT (OUTPATIENT)
Dept: LAB | Facility: HOSPITAL | Age: 76
End: 2021-03-22
Attending: REGISTERED NURSE
Payer: MEDICARE

## 2021-03-22 DIAGNOSIS — Z12.11 COLON CANCER SCREENING: ICD-10-CM

## 2021-03-22 DIAGNOSIS — Z00.00 ENCOUNTER FOR MEDICARE ANNUAL WELLNESS EXAM: ICD-10-CM

## 2021-03-22 PROCEDURE — 82274 ASSAY TEST FOR BLOOD FECAL: CPT | Performed by: REGISTERED NURSE

## 2021-03-26 ENCOUNTER — PATIENT OUTREACH (OUTPATIENT)
Dept: ADMINISTRATIVE | Facility: OTHER | Age: 76
End: 2021-03-26

## 2021-04-06 LAB — HEMOCCULT STL QL IA: NEGATIVE

## 2021-04-08 ENCOUNTER — TELEPHONE (OUTPATIENT)
Dept: FAMILY MEDICINE | Facility: CLINIC | Age: 76
End: 2021-04-08

## 2021-04-09 ENCOUNTER — TELEPHONE (OUTPATIENT)
Dept: FAMILY MEDICINE | Facility: CLINIC | Age: 76
End: 2021-04-09

## 2021-05-10 ENCOUNTER — TELEPHONE (OUTPATIENT)
Dept: OPHTHALMOLOGY | Facility: CLINIC | Age: 76
End: 2021-05-10

## 2021-08-20 ENCOUNTER — IMMUNIZATION (OUTPATIENT)
Dept: PHARMACY | Facility: CLINIC | Age: 76
End: 2021-08-20
Payer: MEDICARE

## 2021-08-20 DIAGNOSIS — Z23 NEED FOR VACCINATION: Primary | ICD-10-CM

## 2021-10-06 ENCOUNTER — IMMUNIZATION (OUTPATIENT)
Dept: INTERNAL MEDICINE | Facility: CLINIC | Age: 76
End: 2021-10-06
Payer: MEDICARE

## 2021-10-06 PROCEDURE — G0008 ADMIN INFLUENZA VIRUS VAC: HCPCS | Mod: PBBFAC

## 2021-10-06 PROCEDURE — 90694 VACC AIIV4 NO PRSRV 0.5ML IM: CPT | Mod: PBBFAC

## 2022-05-02 ENCOUNTER — TELEPHONE (OUTPATIENT)
Dept: ADMINISTRATIVE | Facility: HOSPITAL | Age: 77
End: 2022-05-02
Payer: MEDICARE

## 2022-06-01 ENCOUNTER — PATIENT OUTREACH (OUTPATIENT)
Dept: ADMINISTRATIVE | Facility: HOSPITAL | Age: 77
End: 2022-06-01
Payer: MEDICARE

## 2022-06-06 ENCOUNTER — PES CALL (OUTPATIENT)
Dept: ADMINISTRATIVE | Facility: CLINIC | Age: 77
End: 2022-06-06
Payer: MEDICARE

## 2022-09-07 DIAGNOSIS — Z78.0 MENOPAUSE: ICD-10-CM

## 2022-09-13 ENCOUNTER — PATIENT OUTREACH (OUTPATIENT)
Dept: ADMINISTRATIVE | Facility: HOSPITAL | Age: 77
End: 2022-09-13
Payer: MEDICARE

## 2022-09-13 NOTE — PROGRESS NOTES
Called patient to schedule overdue PCP appt, Patient did not answer, unable to leave a voicemail.

## 2022-09-22 ENCOUNTER — TELEPHONE (OUTPATIENT)
Dept: ADMINISTRATIVE | Facility: HOSPITAL | Age: 77
End: 2022-09-22
Payer: MEDICARE

## 2022-09-26 ENCOUNTER — PES CALL (OUTPATIENT)
Dept: ADMINISTRATIVE | Facility: CLINIC | Age: 77
End: 2022-09-26
Payer: MEDICARE

## 2022-10-12 ENCOUNTER — IMMUNIZATION (OUTPATIENT)
Dept: FAMILY MEDICINE | Facility: CLINIC | Age: 77
End: 2022-10-12
Payer: MEDICARE

## 2022-10-12 PROBLEM — R63.4 WEIGHT LOSS: Status: RESOLVED | Noted: 2021-02-08 | Resolved: 2022-10-12

## 2022-10-12 PROCEDURE — G0008 ADMIN INFLUENZA VIRUS VAC: HCPCS | Mod: PBBFAC,PO

## 2022-10-13 ENCOUNTER — OFFICE VISIT (OUTPATIENT)
Dept: FAMILY MEDICINE | Facility: CLINIC | Age: 77
End: 2022-10-13
Payer: MEDICARE

## 2022-10-13 ENCOUNTER — LAB VISIT (OUTPATIENT)
Dept: LAB | Facility: HOSPITAL | Age: 77
End: 2022-10-13
Attending: INTERNAL MEDICINE
Payer: MEDICARE

## 2022-10-13 VITALS
OXYGEN SATURATION: 96 % | WEIGHT: 110.13 LBS | DIASTOLIC BLOOD PRESSURE: 82 MMHG | SYSTOLIC BLOOD PRESSURE: 118 MMHG | HEART RATE: 102 BPM | TEMPERATURE: 97 F | HEIGHT: 63 IN | BODY MASS INDEX: 19.51 KG/M2

## 2022-10-13 DIAGNOSIS — N18.31 STAGE 3A CHRONIC KIDNEY DISEASE: ICD-10-CM

## 2022-10-13 DIAGNOSIS — M25.50 PAIN IN JOINT INVOLVING MULTIPLE SITES: ICD-10-CM

## 2022-10-13 DIAGNOSIS — Z23 NEED FOR PNEUMOCOCCAL VACCINATION: ICD-10-CM

## 2022-10-13 DIAGNOSIS — Z00.00 PREVENTATIVE HEALTH CARE: Primary | ICD-10-CM

## 2022-10-13 DIAGNOSIS — E78.5 DYSLIPIDEMIA: ICD-10-CM

## 2022-10-13 DIAGNOSIS — Z72.0 TOBACCO USE: ICD-10-CM

## 2022-10-13 DIAGNOSIS — M17.0 PRIMARY OSTEOARTHRITIS OF BOTH KNEES: ICD-10-CM

## 2022-10-13 DIAGNOSIS — Z91.89 AT RISK FOR BONE DENSITY LOSS: ICD-10-CM

## 2022-10-13 LAB
ALBUMIN SERPL BCP-MCNC: 4.1 G/DL (ref 3.5–5.2)
ALP SERPL-CCNC: 66 U/L (ref 55–135)
ALT SERPL W/O P-5'-P-CCNC: 10 U/L (ref 10–44)
ANION GAP SERPL CALC-SCNC: 9 MMOL/L (ref 8–16)
AST SERPL-CCNC: 16 U/L (ref 10–40)
BASOPHILS # BLD AUTO: 0.04 K/UL (ref 0–0.2)
BASOPHILS NFR BLD: 0.4 % (ref 0–1.9)
BILIRUB SERPL-MCNC: 0.7 MG/DL (ref 0.1–1)
BUN SERPL-MCNC: 20 MG/DL (ref 8–23)
CALCIUM SERPL-MCNC: 10.1 MG/DL (ref 8.7–10.5)
CHLORIDE SERPL-SCNC: 106 MMOL/L (ref 95–110)
CHOLEST SERPL-MCNC: 211 MG/DL (ref 120–199)
CHOLEST/HDLC SERPL: 3 {RATIO} (ref 2–5)
CO2 SERPL-SCNC: 24 MMOL/L (ref 23–29)
CREAT SERPL-MCNC: 1.4 MG/DL (ref 0.5–1.4)
DIFFERENTIAL METHOD: ABNORMAL
EOSINOPHIL # BLD AUTO: 0 K/UL (ref 0–0.5)
EOSINOPHIL NFR BLD: 0.4 % (ref 0–8)
ERYTHROCYTE [DISTWIDTH] IN BLOOD BY AUTOMATED COUNT: 14.8 % (ref 11.5–14.5)
EST. GFR  (NO RACE VARIABLE): 38.3 ML/MIN/1.73 M^2
GLUCOSE SERPL-MCNC: 90 MG/DL (ref 70–110)
HCT VFR BLD AUTO: 39.9 % (ref 37–48.5)
HDLC SERPL-MCNC: 71 MG/DL (ref 40–75)
HDLC SERPL: 33.6 % (ref 20–50)
HGB BLD-MCNC: 12.8 G/DL (ref 12–16)
IMM GRANULOCYTES # BLD AUTO: 0.02 K/UL (ref 0–0.04)
IMM GRANULOCYTES NFR BLD AUTO: 0.2 % (ref 0–0.5)
LDLC SERPL CALC-MCNC: 125.2 MG/DL (ref 63–159)
LYMPHOCYTES # BLD AUTO: 1.2 K/UL (ref 1–4.8)
LYMPHOCYTES NFR BLD: 13.2 % (ref 18–48)
MCH RBC QN AUTO: 30 PG (ref 27–31)
MCHC RBC AUTO-ENTMCNC: 32.1 G/DL (ref 32–36)
MCV RBC AUTO: 94 FL (ref 82–98)
MONOCYTES # BLD AUTO: 0.8 K/UL (ref 0.3–1)
MONOCYTES NFR BLD: 8.5 % (ref 4–15)
NEUTROPHILS # BLD AUTO: 7.3 K/UL (ref 1.8–7.7)
NEUTROPHILS NFR BLD: 77.3 % (ref 38–73)
NONHDLC SERPL-MCNC: 140 MG/DL
NRBC BLD-RTO: 0 /100 WBC
PLATELET # BLD AUTO: 228 K/UL (ref 150–450)
PMV BLD AUTO: 11.7 FL (ref 9.2–12.9)
POTASSIUM SERPL-SCNC: 4.3 MMOL/L (ref 3.5–5.1)
PROT SERPL-MCNC: 7.5 G/DL (ref 6–8.4)
RBC # BLD AUTO: 4.26 M/UL (ref 4–5.4)
SODIUM SERPL-SCNC: 139 MMOL/L (ref 136–145)
TRIGL SERPL-MCNC: 74 MG/DL (ref 30–150)
TSH SERPL DL<=0.005 MIU/L-ACNC: 0.67 UIU/ML (ref 0.4–4)
URATE SERPL-MCNC: 7.9 MG/DL (ref 2.4–5.7)
WBC # BLD AUTO: 9.4 K/UL (ref 3.9–12.7)

## 2022-10-13 PROCEDURE — 84443 ASSAY THYROID STIM HORMONE: CPT | Performed by: REGISTERED NURSE

## 2022-10-13 PROCEDURE — 99499 RISK ADDL DX/OHS AUDIT: ICD-10-PCS | Mod: S$GLB,,, | Performed by: REGISTERED NURSE

## 2022-10-13 PROCEDURE — 99999 PR PBB SHADOW E&M-EST. PATIENT-LVL IV: ICD-10-PCS | Mod: PBBFAC,,, | Performed by: REGISTERED NURSE

## 2022-10-13 PROCEDURE — 36415 COLL VENOUS BLD VENIPUNCTURE: CPT | Mod: PO | Performed by: REGISTERED NURSE

## 2022-10-13 PROCEDURE — 80061 LIPID PANEL: CPT | Performed by: REGISTERED NURSE

## 2022-10-13 PROCEDURE — 99499 UNLISTED E&M SERVICE: CPT | Mod: S$GLB,,, | Performed by: REGISTERED NURSE

## 2022-10-13 PROCEDURE — 99214 OFFICE O/P EST MOD 30 MIN: CPT | Mod: PBBFAC,PO | Performed by: REGISTERED NURSE

## 2022-10-13 PROCEDURE — 85025 COMPLETE CBC W/AUTO DIFF WBC: CPT | Performed by: REGISTERED NURSE

## 2022-10-13 PROCEDURE — 80053 COMPREHEN METABOLIC PANEL: CPT | Performed by: REGISTERED NURSE

## 2022-10-13 PROCEDURE — 99214 OFFICE O/P EST MOD 30 MIN: CPT | Mod: S$GLB,,, | Performed by: REGISTERED NURSE

## 2022-10-13 PROCEDURE — G0009 ADMIN PNEUMOCOCCAL VACCINE: HCPCS | Mod: PBBFAC,PO

## 2022-10-13 PROCEDURE — 84550 ASSAY OF BLOOD/URIC ACID: CPT | Performed by: REGISTERED NURSE

## 2022-10-13 PROCEDURE — 99214 PR OFFICE/OUTPT VISIT, EST, LEVL IV, 30-39 MIN: ICD-10-PCS | Mod: S$GLB,,, | Performed by: REGISTERED NURSE

## 2022-10-13 PROCEDURE — 99999 PR PBB SHADOW E&M-EST. PATIENT-LVL IV: CPT | Mod: PBBFAC,,, | Performed by: REGISTERED NURSE

## 2022-10-13 RX ORDER — DICLOFENAC SODIUM 10 MG/G
2-4 GEL TOPICAL 4 TIMES DAILY PRN
Qty: 100 G | Refills: 2 | Status: SHIPPED | OUTPATIENT
Start: 2022-10-13 | End: 2023-04-17

## 2022-10-13 NOTE — PROGRESS NOTES
Subjective:      Ezequiel Vegas is a 77 y.o. female, to the office today for:  ANNUAL WELLNESS    HPI:    Ezequiel Vegas has fasted to have bloodwork done today.  I have reviewed the patient's medical history in detail and updated the computerized patient record.    Health Maintenance Topics with due status: Not Due       Topic Last Completion Date    TETANUS VACCINE 05/12/2018    Lipid Panel 12/11/2020     Health Maintenance Due   Topic Date Due    DEXA Scan  Never done    Shingles Vaccine (1 of 2) Never done --- PHARMACY    Pneumococcal Vaccines (Age 65+) (2 - PPSV23 if available, else PCV20) 12/11/2021 --- PREVNAR-23 DUE       Review of Systems   Constitutional:  Negative for activity change, appetite change, chills, diaphoresis, fatigue, fever and unexpected weight change.        Wt Readings from Last 3 Encounters:  10/13/22 1358 : 50 kg (110 lb 1.9 oz)  02/12/21 1519 : 50.4 kg (111 lb 1.8 oz)  02/12/21 1447 : 50.4 kg (111 lb 1.8 oz)  02/08/21 1424 : 50.4 kg (111 lb)  Reports healthy diet, low-salt.  Water intake fluctuates.  Tries to stay active when possible but does not participate in any formal exercise program.  Smokes 1 ppd.   HENT: Negative.     Eyes:  Negative for discharge and visual disturbance.   Respiratory:  Negative for cough, shortness of breath and wheezing.    Cardiovascular:  Negative for chest pain, palpitations and leg swelling.   Gastrointestinal:  Positive for constipation.   Genitourinary: Negative.    Musculoskeletal:  Positive for arthralgias (both knees).   Integumentary:  Negative for rash and mole/lesion.   Neurological:  Negative for vertigo, seizures, syncope, numbness and headaches.   Hematological: Negative.    Psychiatric/Behavioral:  Positive for sleep disturbance (fluctuates). Negative for depression. The patient is not nervous/anxious.    Breast: negative.        Review of patient's allergies indicates:   Allergen Reactions    Penicillins      Other reaction(s): Itching  "        Patient Active Problem List   Diagnosis    CKD (chronic kidney disease) stage 3, GFR 30-59 ml/min    Dyslipidemia    Memory changes    Tobacco use           Current Outpatient Medications:     BESIVANCE 0.6 % DrpS, INSTILL ONE DROP IN OPERATIVE EYE FOUR TIMES DAILY STARTING THREE DAYS BEFORE SURGERY, Disp: , Rfl: 1    ILEVRO 0.3 % DrpS, INSTILL ONE DROP INTO OPERATIVE EYE ONCE DAILY STARTING THREE DAYS BEFORE SURGERY, Disp: , Rfl: 1    prednisoLONE acetate (PRED FORTE) 1 % DrpS, LOCATION: BOTH EYES. INSTILL ONE DROP IN OPERATIVE EYE FOUR TIMES DAILY STARTING AFTER SURGERY, Disp: , Rfl: 1    prednisoLONE acetate (PRED FORTE) 1 % DrpS, Instill one drop into operative eye 4 times a day for 30 days, Disp: 10 mL, Rfl: 1    tiZANidine (ZANAFLEX) 2 MG tablet, Take 2 tablets (4 mg total) by mouth 2 (two) times daily as needed., Disp: 30 tablet, Rfl: 0    traZODone (DESYREL) 50 MG tablet, Take 0.5 tablets (25 mg total) by mouth nightly as needed for Insomnia., Disp: 30 tablet, Rfl: 1      Past Medical History:   Diagnosis Date    Allergy     Cataract     Gout        Past Surgical History:   Procedure Laterality Date    CATARACT EXTRACTION Right 05/10/2018    HYSTERECTOMY      NEL/BSO    VARICOSE VEIN SURGERY         Family History   Problem Relation Age of Onset    Cervical cancer Mother     Glaucoma Mother     Esophageal cancer Mother     Hypertension Mother     No Known Problems Father     Prostate cancer Maternal Uncle     Lupus Other     Kidney disease Other     Stroke Neg Hx     Diabetes Neg Hx        Social History     Tobacco Use    Smoking status: Light Smoker    Smokeless tobacco: Never    Tobacco comments:     occasionally   Substance Use Topics    Alcohol use: Yes     Comment: occasional    Drug use: No         Objective:     Vitals:    10/13/22 1358   BP: 118/82   Pulse: 102   Temp: 97 °F (36.1 °C)   SpO2: 96%   Weight: 50 kg (110 lb 1.9 oz)   Height: 5' 3" (1.6 m)       Body mass index is 19.51 " kg/m².      Physical Exam  Constitutional:       General: She is not in acute distress.     Appearance: She is well-developed. She is not diaphoretic.   HENT:      Head: Normocephalic and atraumatic.      Right Ear: Tympanic membrane, ear canal and external ear normal. There is no impacted cerumen.      Left Ear: Tympanic membrane, ear canal and external ear normal. There is no impacted cerumen.      Nose: Nose normal. No nasal deformity, mucosal edema, congestion or rhinorrhea.      Mouth/Throat:      Mouth: Mucous membranes are moist. Mucous membranes are not dry and not cyanotic. No oral lesions.      Dentition: Normal dentition.      Pharynx: Oropharynx is clear. Uvula midline. No oropharyngeal exudate, posterior oropharyngeal erythema or uvula swelling.      Tonsils: No tonsillar abscesses.   Eyes:      General: Lids are normal. Lids are everted, no foreign bodies appreciated. No scleral icterus.        Right eye: No discharge.         Left eye: No discharge.      Conjunctiva/sclera: Conjunctivae normal.      Right eye: Right conjunctiva is not injected. No exudate.     Left eye: Left conjunctiva is not injected. No exudate.     Pupils: Pupils are equal, round, and reactive to light.   Neck:      Thyroid: No thyroid mass or thyromegaly.      Vascular: No carotid bruit or JVD.      Trachea: No tracheal deviation.   Cardiovascular:      Rate and Rhythm: Normal rate and regular rhythm.      Pulses: Normal pulses.      Heart sounds: Normal heart sounds. No murmur heard.    No friction rub. No gallop.   Pulmonary:      Effort: Pulmonary effort is normal. No respiratory distress.      Breath sounds: Normal breath sounds. No stridor. No wheezing.   Chest:      Chest wall: No tenderness.   Abdominal:      General: Bowel sounds are normal. There is no distension.      Palpations: Abdomen is soft. There is no mass.      Tenderness: There is no abdominal tenderness. There is no guarding or rebound.   Genitourinary:      Comments: Deferred, h/o hysterectomy  Musculoskeletal:         General: No swelling, tenderness or deformity.      Cervical back: Normal range of motion and neck supple. No edema or erythema. Normal range of motion.      Right knee: Bony tenderness and crepitus present. No swelling, deformity or effusion. Decreased range of motion. Normal alignment.      Left knee: Bony tenderness and crepitus present. No swelling, deformity or effusion. Decreased range of motion. Normal alignment.   Lymphadenopathy:      Cervical: No cervical adenopathy.   Skin:     General: Skin is warm and dry.      Capillary Refill: Capillary refill takes less than 2 seconds.      Coloration: Skin is not pale.      Findings: No bruising, erythema or rash.   Neurological:      Mental Status: She is alert and oriented to person, place, and time. Mental status is at baseline.      Motor: No tremor, atrophy or abnormal muscle tone.      Deep Tendon Reflexes: Reflexes are normal and symmetric.   Psychiatric:         Mood and Affect: Mood normal.         Speech: Speech normal.         Behavior: Behavior normal.         Thought Content: Thought content normal.         Cognition and Memory: Memory is not impaired.         Judgment: Judgment normal.       LABS REVIEWED:    Lab Results   Component Value Date    WBC 9.63 02/08/2021    RBC 4.34 02/08/2021    HGB 12.6 02/08/2021    HCT 41.5 02/08/2021    MCV 96 02/08/2021    MCH 29.0 02/08/2021    MCHC 30.4 (L) 02/08/2021    RDW 15.1 (H) 02/08/2021     02/08/2021    MPV 12.1 02/08/2021    GRAN 6.0 02/08/2021    GRAN 62.5 02/08/2021    LYMPH 2.6 02/08/2021    LYMPH 27.4 02/08/2021    MONO 0.6 02/08/2021    MONO 6.5 02/08/2021    EOS 0.3 02/08/2021    BASO 0.05 02/08/2021    EOSINOPHIL 2.9 02/08/2021    BASOPHIL 0.5 02/08/2021     No results found for: IRON, TIBC, FERRITIN, SATURATEDIRO    Sodium   Date Value Ref Range Status   02/08/2021 142 136 - 145 mmol/L Final     Potassium   Date Value Ref Range  Status   02/08/2021 4.5 3.5 - 5.1 mmol/L Final     Chloride   Date Value Ref Range Status   02/08/2021 107 95 - 110 mmol/L Final     CO2   Date Value Ref Range Status   02/08/2021 23 23 - 29 mmol/L Final     Glucose   Date Value Ref Range Status   02/08/2021 69 (L) 70 - 110 mg/dL Final     BUN   Date Value Ref Range Status   02/08/2021 18 8 - 23 mg/dL Final     Creatinine   Date Value Ref Range Status   02/08/2021 1.1 0.5 - 1.4 mg/dL Final     Calcium   Date Value Ref Range Status   02/08/2021 9.7 8.7 - 10.5 mg/dL Final     Total Protein   Date Value Ref Range Status   02/08/2021 7.4 6.0 - 8.4 g/dL Final     Albumin   Date Value Ref Range Status   02/08/2021 3.7 3.5 - 5.2 g/dL Final     Total Bilirubin   Date Value Ref Range Status   02/08/2021 0.4 0.1 - 1.0 mg/dL Final     Comment:     For infants and newborns, interpretation of results should be based  on gestational age, weight and in agreement with clinical  observations.    Premature Infant recommended reference ranges:  Up to 24 hours.............<8.0 mg/dL  Up to 48 hours............<12.0 mg/dL  3-5 days..................<15.0 mg/dL  6-29 days.................<15.0 mg/dL       Alkaline Phosphatase   Date Value Ref Range Status   02/08/2021 60 55 - 135 U/L Final     AST   Date Value Ref Range Status   02/08/2021 20 10 - 40 U/L Final     ALT   Date Value Ref Range Status   02/08/2021 12 10 - 44 U/L Final     Anion Gap   Date Value Ref Range Status   02/08/2021 12 8 - 16 mmol/L Final         eGFR if    Date Value Ref Range Status   02/08/2021 56.0 (A) >60 mL/min/1.73 m^2 Final        Lab Results   Component Value Date    CHOL 225 (H) 12/11/2020     Lab Results   Component Value Date    TRIG 90 12/11/2020     Lab Results   Component Value Date    HDL 76 (H) 12/11/2020     Lab Results   Component Value Date    LDLCALC 131.0 12/11/2020       Lab Results   Component Value Date    TSH 0.605 02/08/2021       Lab Results   Component Value Date    HGBA1C  5.2 12/11/2020       Lab Results   Component Value Date    [id number] Negative 12/11/2020       Lab Results   Component Value Date    HEPCAB Negative 12/11/2020       Diagnosis/Assessment:     1. Preventative health care    2. Dyslipidemia  - CBC Auto Differential; Future  - Comprehensive Metabolic Panel; Future  - TSH; Future  - Lipid Panel; Future    3. Stage 3a chronic kidney disease  - Comprehensive Metabolic Panel; Future    4. Primary osteoarthritis of both knees  - diclofenac sodium (VOLTAREN) 1 % Gel; Apply 2-4 g topically 4 (four) times daily as needed.  Dispense: 100 g; Refill: 2    5. Pain in joint involving multiple sites  - Uric Acid; Future    6. At risk for bone density loss  - DXA Bone Density Spine And Hip; Future    7. Tobacco use  - Ambulatory referral/consult to Smoking Cessation Program; Future  - DXA Bone Density Spine And Hip; Future    8. Need for pneumococcal vaccination  - (In Office Administered) Pneumococcal Polysaccharide Vaccine (23 Valent) (SQ/IM)       Follow-Up:     Pending lab.  RTC as directed or prn.        RIKI Urena  [facility]       30 minutes of total time spent on the encounter, which includes face to face time and non-face to face time preparing to see the patient (eg, review of tests), obtaining and/or reviewing separately obtained history, and documenting clinical information in the electronic or other health record.  Also includes independent interpretation of results (not separately reported) and communicating results to the patient/family/caregiver, with care coordination (not separately reported).

## 2022-10-17 DIAGNOSIS — N18.32 STAGE 3B CHRONIC KIDNEY DISEASE: ICD-10-CM

## 2022-10-17 DIAGNOSIS — E79.0 HYPERURICEMIA: Primary | ICD-10-CM

## 2022-10-21 ENCOUNTER — TELEPHONE (OUTPATIENT)
Dept: FAMILY MEDICINE | Facility: CLINIC | Age: 77
End: 2022-10-21
Payer: MEDICARE

## 2022-10-21 NOTE — TELEPHONE ENCOUNTER
----- Message from Stacie Layton sent at 10/21/2022  3:09 PM CDT -----  Contact: Self/[phone]  Pt is calling in regards to a missed call. Please give her a call back at [phone]. Thank you s/g

## 2022-11-16 ENCOUNTER — NURSE TRIAGE (OUTPATIENT)
Dept: ADMINISTRATIVE | Facility: CLINIC | Age: 77
End: 2022-11-16
Payer: MEDICARE

## 2022-11-16 NOTE — TELEPHONE ENCOUNTER
Patient's sister called to schedule a Pfizer Covid-19 Booster for patient. Appointment scheduled for 11/21/22 @ 1:00 PM at the [facility].      Reason for Disposition   Information only question and nurse able to answer    Additional Information   Negative: Nursing judgment   Negative: Nursing judgment   Negative: Nursing judgment   Negative: Nursing judgment    Protocols used: Information Only Call - No Triage-A-[place]

## 2022-11-21 ENCOUNTER — IMMUNIZATION (OUTPATIENT)
Dept: PRIMARY CARE CLINIC | Facility: CLINIC | Age: 77
End: 2022-11-21
Payer: MEDICARE

## 2022-11-21 DIAGNOSIS — Z23 NEED FOR VACCINATION: Primary | ICD-10-CM

## 2022-11-21 PROCEDURE — 0134A COVID-19, MRNA, LNP-S, BIVALENT BOOSTER, PF, 50 MCG/0.5 ML: CPT | Mod: CV19,PBBFAC | Performed by: FAMILY MEDICINE

## 2022-11-21 PROCEDURE — 91313 COVID-19, MRNA, LNP-S, BIVALENT BOOSTER, PF, 50 MCG/0.5 ML: CPT | Mod: PBBFAC | Performed by: FAMILY MEDICINE

## 2022-11-23 DIAGNOSIS — N18.30 STAGE 3 CHRONIC KIDNEY DISEASE, UNSPECIFIED WHETHER STAGE 3A OR 3B CKD: Primary | ICD-10-CM

## 2023-01-03 ENCOUNTER — TELEPHONE (OUTPATIENT)
Dept: NEPHROLOGY | Facility: CLINIC | Age: 78
End: 2023-01-03
Payer: MEDICARE

## 2023-01-03 NOTE — TELEPHONE ENCOUNTER
"CALLED X 2 TO DO LABS, OR RESCHEDULE. UNABLE TO L/M VM FULL. OTHER NUMBER NOT IN SERVICE". 1/3/23/SF   "

## 2023-04-17 ENCOUNTER — TELEPHONE (OUTPATIENT)
Dept: FAMILY MEDICINE | Facility: CLINIC | Age: 78
End: 2023-04-17

## 2023-04-17 ENCOUNTER — HOSPITAL ENCOUNTER (OUTPATIENT)
Dept: RADIOLOGY | Facility: HOSPITAL | Age: 78
Discharge: HOME | End: 2023-04-17
Attending: INTERNAL MEDICINE
Payer: MEDICARE

## 2023-04-17 ENCOUNTER — OFFICE VISIT (OUTPATIENT)
Dept: FAMILY MEDICINE | Facility: CLINIC | Age: 78
End: 2023-04-17
Payer: MEDICARE

## 2023-04-17 VITALS
TEMPERATURE: 98 F | OXYGEN SATURATION: 98 % | BODY MASS INDEX: 18.82 KG/M2 | HEART RATE: 56 BPM | DIASTOLIC BLOOD PRESSURE: 60 MMHG | WEIGHT: 106.25 LBS | SYSTOLIC BLOOD PRESSURE: 110 MMHG | RESPIRATION RATE: 16 BRPM

## 2023-04-17 DIAGNOSIS — R53.81 DEBILITY: ICD-10-CM

## 2023-04-17 DIAGNOSIS — I10 HYPERTENSION, UNSPECIFIED TYPE: ICD-10-CM

## 2023-04-17 DIAGNOSIS — Z09 HOSPITAL DISCHARGE FOLLOW-UP: ICD-10-CM

## 2023-04-17 DIAGNOSIS — M79.673 PAIN OF FOOT, UNSPECIFIED LATERALITY: ICD-10-CM

## 2023-04-17 DIAGNOSIS — J18.9 PNEUMONIA DUE TO INFECTIOUS ORGANISM, UNSPECIFIED LATERALITY, UNSPECIFIED PART OF LUNG: Primary | ICD-10-CM

## 2023-04-17 DIAGNOSIS — J18.9 PNEUMONIA DUE TO INFECTIOUS ORGANISM, UNSPECIFIED LATERALITY, UNSPECIFIED PART OF LUNG: ICD-10-CM

## 2023-04-17 DIAGNOSIS — I48.91 ATRIAL FIBRILLATION, UNSPECIFIED TYPE: ICD-10-CM

## 2023-04-17 DIAGNOSIS — Z79.01 ANTICOAGULATED: ICD-10-CM

## 2023-04-17 PROCEDURE — 1159F PR MEDICATION LIST DOCUMENTED IN MEDICAL RECORD: ICD-10-PCS | Mod: CPTII,S$GLB,, | Performed by: INTERNAL MEDICINE

## 2023-04-17 PROCEDURE — 1126F AMNT PAIN NOTED NONE PRSNT: CPT | Mod: CPTII,S$GLB,, | Performed by: INTERNAL MEDICINE

## 2023-04-17 PROCEDURE — 71046 X-RAY EXAM CHEST 2 VIEWS: CPT | Mod: 26,,, | Performed by: RADIOLOGY

## 2023-04-17 PROCEDURE — 71046 XR CHEST PA AND LATERAL: ICD-10-PCS | Mod: 26,,, | Performed by: RADIOLOGY

## 2023-04-17 PROCEDURE — 99999 PR PBB SHADOW E&M-EST. PATIENT-LVL V: CPT | Mod: PBBFAC,,, | Performed by: INTERNAL MEDICINE

## 2023-04-17 PROCEDURE — 99999 PR PBB SHADOW E&M-EST. PATIENT-LVL V: ICD-10-PCS | Mod: PBBFAC,,, | Performed by: INTERNAL MEDICINE

## 2023-04-17 PROCEDURE — 1101F PT FALLS ASSESS-DOCD LE1/YR: CPT | Mod: CPTII,S$GLB,, | Performed by: INTERNAL MEDICINE

## 2023-04-17 PROCEDURE — 3078F DIAST BP <80 MM HG: CPT | Mod: CPTII,S$GLB,, | Performed by: INTERNAL MEDICINE

## 2023-04-17 PROCEDURE — 99214 OFFICE O/P EST MOD 30 MIN: CPT | Mod: S$GLB,,, | Performed by: INTERNAL MEDICINE

## 2023-04-17 PROCEDURE — 3074F SYST BP LT 130 MM HG: CPT | Mod: CPTII,S$GLB,, | Performed by: INTERNAL MEDICINE

## 2023-04-17 PROCEDURE — 99214 PR OFFICE/OUTPT VISIT, EST, LEVL IV, 30-39 MIN: ICD-10-PCS | Mod: S$GLB,,, | Performed by: INTERNAL MEDICINE

## 2023-04-17 PROCEDURE — 3078F PR MOST RECENT DIASTOLIC BLOOD PRESSURE < 80 MM HG: ICD-10-PCS | Mod: CPTII,S$GLB,, | Performed by: INTERNAL MEDICINE

## 2023-04-17 PROCEDURE — 3288F PR FALLS RISK ASSESSMENT DOCUMENTED: ICD-10-PCS | Mod: CPTII,S$GLB,, | Performed by: INTERNAL MEDICINE

## 2023-04-17 PROCEDURE — 1159F MED LIST DOCD IN RCRD: CPT | Mod: CPTII,S$GLB,, | Performed by: INTERNAL MEDICINE

## 2023-04-17 PROCEDURE — 1126F PR PAIN SEVERITY QUANTIFIED, NO PAIN PRESENT: ICD-10-PCS | Mod: CPTII,S$GLB,, | Performed by: INTERNAL MEDICINE

## 2023-04-17 PROCEDURE — 71046 X-RAY EXAM CHEST 2 VIEWS: CPT | Mod: TC,FY,PO

## 2023-04-17 PROCEDURE — 3288F FALL RISK ASSESSMENT DOCD: CPT | Mod: CPTII,S$GLB,, | Performed by: INTERNAL MEDICINE

## 2023-04-17 PROCEDURE — 3074F PR MOST RECENT SYSTOLIC BLOOD PRESSURE < 130 MM HG: ICD-10-PCS | Mod: CPTII,S$GLB,, | Performed by: INTERNAL MEDICINE

## 2023-04-17 PROCEDURE — 1101F PR PT FALLS ASSESS DOC 0-1 FALLS W/OUT INJ PAST YR: ICD-10-PCS | Mod: CPTII,S$GLB,, | Performed by: INTERNAL MEDICINE

## 2023-04-17 RX ORDER — METOPROLOL SUCCINATE 50 MG/1
50 TABLET, EXTENDED RELEASE ORAL
COMMUNITY
Start: 2023-04-06 | End: 2023-04-21 | Stop reason: SDUPTHER

## 2023-04-17 RX ORDER — BENZONATATE 200 MG/1
200 CAPSULE ORAL 3 TIMES DAILY PRN
COMMUNITY

## 2023-04-17 RX ORDER — HYDROCODONE BITARTRATE AND ACETAMINOPHEN 5; 325 MG/1; MG/1
1 TABLET ORAL 4 TIMES DAILY PRN
COMMUNITY
Start: 2023-04-06

## 2023-04-17 RX ORDER — AMLODIPINE BESYLATE 10 MG/1
10 TABLET ORAL
COMMUNITY
Start: 2023-04-06 | End: 2023-04-21 | Stop reason: SDUPTHER

## 2023-04-17 RX ORDER — APIXABAN 2.5 MG/1
2.5 TABLET, FILM COATED ORAL 2 TIMES DAILY
COMMUNITY
Start: 2023-04-06 | End: 2023-04-21 | Stop reason: SDUPTHER

## 2023-04-17 NOTE — PROGRESS NOTES
Subjective:       Patient ID: Ezequiel Vegas is a 78 y.o. female.    Chief Complaint: Hospital Follow Up    [facility] f/u for pneumonia, sepsis, afib.        Treated------here with niece-----lives with niece for now.           Doing much better.         Eating well.   Getting around better.        No SOB------    Past Medical History:   Diagnosis Date    Allergy     Cataract     Gout      Past Surgical History:   Procedure Laterality Date    CATARACT EXTRACTION Right 05/10/2018    HYSTERECTOMY      NEL/BSO    VARICOSE VEIN SURGERY       Family History   Problem Relation Age of Onset    Cervical cancer Mother     Glaucoma Mother     Esophageal cancer Mother     Hypertension Mother     No Known Problems Father     Prostate cancer Maternal Uncle     Lupus Other     Kidney disease Other     Stroke Neg Hx     Diabetes Neg Hx      Social History     Socioeconomic History    Marital status:     Number of children: 0   Tobacco Use    Smoking status: Never    Smokeless tobacco: Never    Tobacco comments:     occasionally   Substance and Sexual Activity    Alcohol use: Yes     Comment: occasional    Drug use: No    Sexual activity: Never     Review of Systems   Constitutional:  Negative for chills and fever.   HENT:  Negative for congestion.    Respiratory:  Negative for apnea, cough, choking, chest tightness, shortness of breath, wheezing and stridor.    Cardiovascular:  Negative for chest pain and palpitations.   Gastrointestinal:  Negative for abdominal distention, nausea and vomiting.   Genitourinary:  Negative for flank pain and frequency.   Musculoskeletal:  Positive for arthralgias, gait problem and myalgias.   Neurological:  Positive for weakness. Negative for dizziness.   Psychiatric/Behavioral:  Negative for agitation, behavioral problems and sleep disturbance.      Objective:      Physical Exam  Vitals and nursing note reviewed.   Constitutional:       General: She is not in acute distress.      Appearance: Normal appearance. She is well-developed. She is not diaphoretic.   HENT:      Head: Normocephalic and atraumatic.      Mouth/Throat:      Pharynx: No oropharyngeal exudate.   Eyes:      General: No scleral icterus.  Neck:      Thyroid: No thyromegaly.      Vascular: No carotid bruit or JVD.      Trachea: No tracheal deviation.   Cardiovascular:      Rate and Rhythm: Normal rate. Rhythm irregular.      Heart sounds: Normal heart sounds.   Pulmonary:      Effort: Pulmonary effort is normal. No respiratory distress.      Breath sounds: Normal breath sounds. No wheezing or rales.   Chest:      Chest wall: No tenderness.   Abdominal:      General: Bowel sounds are normal. There is no distension.      Palpations: Abdomen is soft.      Tenderness: There is no abdominal tenderness. There is no guarding or rebound.   Musculoskeletal:         General: No tenderness. Normal range of motion.      Cervical back: Normal range of motion and neck supple.   Lymphadenopathy:      Cervical: No cervical adenopathy.   Skin:     General: Skin is warm and dry.      Coloration: Skin is not pale.      Findings: No erythema or rash.   Neurological:      Mental Status: She is alert and oriented to person, place, and time.       CMP  Sodium   Date Value Ref Range Status   10/13/2022 139 136 - 145 mmol/L Final     Potassium   Date Value Ref Range Status   10/13/2022 4.3 3.5 - 5.1 mmol/L Final     Chloride   Date Value Ref Range Status   10/13/2022 106 95 - 110 mmol/L Final     CO2   Date Value Ref Range Status   10/13/2022 24 23 - 29 mmol/L Final     Glucose   Date Value Ref Range Status   10/13/2022 90 70 - 110 mg/dL Final     BUN   Date Value Ref Range Status   10/13/2022 20 8 - 23 mg/dL Final     Creatinine   Date Value Ref Range Status   10/13/2022 1.4 0.5 - 1.4 mg/dL Final     Calcium   Date Value Ref Range Status   10/13/2022 10.1 8.7 - 10.5 mg/dL Final     Total Protein   Date Value Ref Range Status   10/13/2022 7.5 6.0 -  8.4 g/dL Final     Albumin   Date Value Ref Range Status   10/13/2022 4.1 3.5 - 5.2 g/dL Final     Total Bilirubin   Date Value Ref Range Status   10/13/2022 0.7 0.1 - 1.0 mg/dL Final     Comment:     For infants and newborns, interpretation of results should be based  on gestational age, weight and in agreement with clinical  observations.    Premature Infant recommended reference ranges:  Up to 24 hours.............<8.0 mg/dL  Up to 48 hours............<12.0 mg/dL  3-5 days..................<15.0 mg/dL  6-29 days.................<15.0 mg/dL       Alkaline Phosphatase   Date Value Ref Range Status   10/13/2022 66 55 - 135 U/L Final     AST   Date Value Ref Range Status   10/13/2022 16 10 - 40 U/L Final     ALT   Date Value Ref Range Status   10/13/2022 10 10 - 44 U/L Final     Anion Gap   Date Value Ref Range Status   10/13/2022 9 8 - 16 mmol/L Final     eGFR if    Date Value Ref Range Status   02/08/2021 56.0 (A) >60 mL/min/1.73 m^2 Final     eGFR if non    Date Value Ref Range Status   02/08/2021 48.5 (A) >60 mL/min/1.73 m^2 Final     Comment:     Calculation used to obtain the estimated glomerular filtration  rate (eGFR) is the CKD-EPI equation.        Lab Results   Component Value Date    WBC 9.40 10/13/2022    HGB 12.8 10/13/2022    HCT 39.9 10/13/2022    MCV 94 10/13/2022     10/13/2022     Lab Results   Component Value Date    CHOL 211 (H) 10/13/2022     Lab Results   Component Value Date    HDL 71 10/13/2022     Lab Results   Component Value Date    LDLCALC 125.2 10/13/2022     Lab Results   Component Value Date    TRIG 74 10/13/2022     Lab Results   Component Value Date    CHOLHDL 33.6 10/13/2022     Lab Results   Component Value Date    TSH 0.669 10/13/2022     Lab Results   Component Value Date    HGBA1C 5.2 12/11/2020     Assessment:       1. Pneumonia due to infectious organism, unspecified laterality, unspecified part of lung    2. Atrial fibrillation,  unspecified type    3. Anticoagulated    4. Hospital discharge follow-up    5. Debility    6. Pain of foot, unspecified laterality    7. Hypertension, unspecified type        Plan:   Pneumonia due to infectious organism, unspecified laterality, unspecified part of lung---treated-  -     X-Ray Chest PA And Lateral; Future; Expected date: 04/17/2023    Atrial fibrillation, unspecified type----------on BB and eliquis-----------  -     Ambulatory referral/consult to Cardiology; Future; Expected date: 04/24/2023    Anticoagulated    Hospital discharge follow-up  -     X-Ray Chest PA And Lateral; Future; Expected date: 04/17/2023  -     Ambulatory referral/consult to Cardiology; Future; Expected date: 04/24/2023    Debility--------improving    Pain of foot, unspecified laterality  -     Ambulatory referral/consult to Podiatry; Future; Expected date: 04/24/2023    Hypertension, unspecified type  -     Comprehensive Metabolic Panel; Future; Expected date: 04/17/2023  -     CBC Auto Differential; Future; Expected date: 04/17/2023      Stable-----------------continue meds-------------------as above--------------f/u 1 month------------   today

## 2023-04-17 NOTE — TELEPHONE ENCOUNTER
Cxr infiltrate not clear off xray yet---usually takes more time--------will repeat cxr with f/u with me-

## 2023-04-18 ENCOUNTER — TELEPHONE (OUTPATIENT)
Dept: FAMILY MEDICINE | Facility: CLINIC | Age: 78
End: 2023-04-18
Payer: MEDICARE

## 2023-04-18 DIAGNOSIS — I48.91 ATRIAL FIBRILLATION, UNSPECIFIED TYPE: Primary | ICD-10-CM

## 2023-04-18 DIAGNOSIS — R79.89 LFT ELEVATION: Primary | ICD-10-CM

## 2023-04-18 NOTE — TELEPHONE ENCOUNTER
----- Message from Felicia Alvarez sent at 4/18/2023 11:30 AM CDT -----  Regarding: Test Results  Good Morning, patient had labs and xrays done on yesterday. Ms. Loyd (Niece-caretaker) ask that you call her at [phone] with results as patient will not understand. Thanks/elr

## 2023-04-20 DIAGNOSIS — M79.672 BILATERAL FOOT PAIN: Primary | ICD-10-CM

## 2023-04-20 DIAGNOSIS — M79.671 BILATERAL FOOT PAIN: Primary | ICD-10-CM

## 2023-04-21 ENCOUNTER — HOSPITAL ENCOUNTER (OUTPATIENT)
Dept: RADIOLOGY | Facility: HOSPITAL | Age: 78
Discharge: HOME | End: 2023-04-21
Attending: PODIATRIST
Payer: MEDICARE

## 2023-04-21 ENCOUNTER — HOSPITAL ENCOUNTER (OUTPATIENT)
Dept: CARDIOLOGY | Facility: HOSPITAL | Age: 78
Discharge: HOME | End: 2023-04-21
Attending: INTERNAL MEDICINE
Payer: MEDICARE

## 2023-04-21 ENCOUNTER — OFFICE VISIT (OUTPATIENT)
Dept: PODIATRY | Facility: CLINIC | Age: 78
End: 2023-04-21
Payer: COMMERCIAL

## 2023-04-21 ENCOUNTER — OFFICE VISIT (OUTPATIENT)
Dept: CARDIOLOGY | Facility: CLINIC | Age: 78
End: 2023-04-21
Payer: COMMERCIAL

## 2023-04-21 VITALS — HEIGHT: 63 IN | BODY MASS INDEX: 18.78 KG/M2 | WEIGHT: 106 LBS

## 2023-04-21 VITALS
HEART RATE: 58 BPM | DIASTOLIC BLOOD PRESSURE: 62 MMHG | BODY MASS INDEX: 18.82 KG/M2 | WEIGHT: 106.25 LBS | SYSTOLIC BLOOD PRESSURE: 104 MMHG

## 2023-04-21 DIAGNOSIS — M79.673 PAIN OF FOOT, UNSPECIFIED LATERALITY: ICD-10-CM

## 2023-04-21 DIAGNOSIS — I10 PRIMARY HYPERTENSION: ICD-10-CM

## 2023-04-21 DIAGNOSIS — M79.671 BILATERAL FOOT PAIN: ICD-10-CM

## 2023-04-21 DIAGNOSIS — I48.0 PAF (PAROXYSMAL ATRIAL FIBRILLATION): ICD-10-CM

## 2023-04-21 DIAGNOSIS — M19.072 OSTEOARTHRITIS OF JOINT OF TOE OF LEFT FOOT: Primary | ICD-10-CM

## 2023-04-21 DIAGNOSIS — M79.672 BILATERAL FOOT PAIN: ICD-10-CM

## 2023-04-21 DIAGNOSIS — E78.5 DYSLIPIDEMIA: ICD-10-CM

## 2023-04-21 DIAGNOSIS — M17.0 PRIMARY OSTEOARTHRITIS OF BOTH KNEES: ICD-10-CM

## 2023-04-21 DIAGNOSIS — N18.31 STAGE 3A CHRONIC KIDNEY DISEASE: ICD-10-CM

## 2023-04-21 DIAGNOSIS — R53.81 DEBILITY: ICD-10-CM

## 2023-04-21 DIAGNOSIS — I73.9 ARTERIAL INSUFFICIENCY OF LOWER EXTREMITY: ICD-10-CM

## 2023-04-21 DIAGNOSIS — I48.91 ATRIAL FIBRILLATION, UNSPECIFIED TYPE: ICD-10-CM

## 2023-04-21 DIAGNOSIS — B35.1 DERMATOPHYTOSIS OF NAIL: ICD-10-CM

## 2023-04-21 DIAGNOSIS — I48.91 ATRIAL FIBRILLATION, UNSPECIFIED TYPE: Primary | ICD-10-CM

## 2023-04-21 DIAGNOSIS — Z09 HOSPITAL DISCHARGE FOLLOW-UP: ICD-10-CM

## 2023-04-21 PROCEDURE — 99204 OFFICE O/P NEW MOD 45 MIN: CPT | Mod: S$GLB,,, | Performed by: INTERNAL MEDICINE

## 2023-04-21 PROCEDURE — 99204 OFFICE O/P NEW MOD 45 MIN: CPT | Mod: S$GLB,,, | Performed by: PODIATRIST

## 2023-04-21 PROCEDURE — 1160F PR REVIEW ALL MEDS BY PRESCRIBER/CLIN PHARMACIST DOCUMENTED: ICD-10-PCS | Mod: CPTII,S$GLB,, | Performed by: INTERNAL MEDICINE

## 2023-04-21 PROCEDURE — 1126F AMNT PAIN NOTED NONE PRSNT: CPT | Mod: CPTII,S$GLB,, | Performed by: INTERNAL MEDICINE

## 2023-04-21 PROCEDURE — 3288F PR FALLS RISK ASSESSMENT DOCUMENTED: ICD-10-PCS | Mod: CPTII,S$GLB,, | Performed by: PODIATRIST

## 2023-04-21 PROCEDURE — 3078F PR MOST RECENT DIASTOLIC BLOOD PRESSURE < 80 MM HG: ICD-10-PCS | Mod: CPTII,S$GLB,, | Performed by: INTERNAL MEDICINE

## 2023-04-21 PROCEDURE — 99204 PR OFFICE/OUTPT VISIT, NEW, LEVL IV, 45-59 MIN: ICD-10-PCS | Mod: S$GLB,,, | Performed by: INTERNAL MEDICINE

## 2023-04-21 PROCEDURE — 99999 PR PBB SHADOW E&M-EST. PATIENT-LVL III: CPT | Mod: PBBFAC,,, | Performed by: PODIATRIST

## 2023-04-21 PROCEDURE — 1101F PT FALLS ASSESS-DOCD LE1/YR: CPT | Mod: CPTII,S$GLB,, | Performed by: PODIATRIST

## 2023-04-21 PROCEDURE — 99999 PR PBB SHADOW E&M-EST. PATIENT-LVL III: CPT | Mod: PBBFAC,,, | Performed by: INTERNAL MEDICINE

## 2023-04-21 PROCEDURE — 1126F PR PAIN SEVERITY QUANTIFIED, NO PAIN PRESENT: ICD-10-PCS | Mod: CPTII,S$GLB,, | Performed by: INTERNAL MEDICINE

## 2023-04-21 PROCEDURE — 1101F PR PT FALLS ASSESS DOC 0-1 FALLS W/OUT INJ PAST YR: ICD-10-PCS | Mod: CPTII,S$GLB,, | Performed by: INTERNAL MEDICINE

## 2023-04-21 PROCEDURE — 1125F PR PAIN SEVERITY QUANTIFIED, PAIN PRESENT: ICD-10-PCS | Mod: CPTII,S$GLB,, | Performed by: PODIATRIST

## 2023-04-21 PROCEDURE — 1159F PR MEDICATION LIST DOCUMENTED IN MEDICAL RECORD: ICD-10-PCS | Mod: CPTII,S$GLB,, | Performed by: INTERNAL MEDICINE

## 2023-04-21 PROCEDURE — 3288F FALL RISK ASSESSMENT DOCD: CPT | Mod: CPTII,S$GLB,, | Performed by: INTERNAL MEDICINE

## 2023-04-21 PROCEDURE — 73630 X-RAY EXAM OF FOOT: CPT | Mod: TC,50

## 2023-04-21 PROCEDURE — 99999 PR PBB SHADOW E&M-EST. PATIENT-LVL III: ICD-10-PCS | Mod: PBBFAC,,, | Performed by: PODIATRIST

## 2023-04-21 PROCEDURE — 99204 PR OFFICE/OUTPT VISIT, NEW, LEVL IV, 45-59 MIN: ICD-10-PCS | Mod: S$GLB,,, | Performed by: PODIATRIST

## 2023-04-21 PROCEDURE — 99999 PR PBB SHADOW E&M-EST. PATIENT-LVL III: ICD-10-PCS | Mod: PBBFAC,,, | Performed by: INTERNAL MEDICINE

## 2023-04-21 PROCEDURE — 3078F DIAST BP <80 MM HG: CPT | Mod: CPTII,S$GLB,, | Performed by: INTERNAL MEDICINE

## 2023-04-21 PROCEDURE — 73630 X-RAY EXAM OF FOOT: CPT | Mod: 26,50,, | Performed by: RADIOLOGY

## 2023-04-21 PROCEDURE — 3074F PR MOST RECENT SYSTOLIC BLOOD PRESSURE < 130 MM HG: ICD-10-PCS | Mod: CPTII,S$GLB,, | Performed by: INTERNAL MEDICINE

## 2023-04-21 PROCEDURE — 3288F FALL RISK ASSESSMENT DOCD: CPT | Mod: CPTII,S$GLB,, | Performed by: PODIATRIST

## 2023-04-21 PROCEDURE — 1125F AMNT PAIN NOTED PAIN PRSNT: CPT | Mod: CPTII,S$GLB,, | Performed by: PODIATRIST

## 2023-04-21 PROCEDURE — 73630 XR FOOT COMPLETE 3 VIEW BILATERAL: ICD-10-PCS | Mod: 26,50,, | Performed by: RADIOLOGY

## 2023-04-21 PROCEDURE — 93010 EKG 12-LEAD: ICD-10-PCS | Mod: ,,, | Performed by: INTERNAL MEDICINE

## 2023-04-21 PROCEDURE — 1101F PT FALLS ASSESS-DOCD LE1/YR: CPT | Mod: CPTII,S$GLB,, | Performed by: INTERNAL MEDICINE

## 2023-04-21 PROCEDURE — 93010 ELECTROCARDIOGRAM REPORT: CPT | Mod: ,,, | Performed by: INTERNAL MEDICINE

## 2023-04-21 PROCEDURE — 1160F RVW MEDS BY RX/DR IN RCRD: CPT | Mod: CPTII,S$GLB,, | Performed by: INTERNAL MEDICINE

## 2023-04-21 PROCEDURE — 1101F PR PT FALLS ASSESS DOC 0-1 FALLS W/OUT INJ PAST YR: ICD-10-PCS | Mod: CPTII,S$GLB,, | Performed by: PODIATRIST

## 2023-04-21 PROCEDURE — 3074F SYST BP LT 130 MM HG: CPT | Mod: CPTII,S$GLB,, | Performed by: INTERNAL MEDICINE

## 2023-04-21 PROCEDURE — 3288F PR FALLS RISK ASSESSMENT DOCUMENTED: ICD-10-PCS | Mod: CPTII,S$GLB,, | Performed by: INTERNAL MEDICINE

## 2023-04-21 PROCEDURE — 1159F MED LIST DOCD IN RCRD: CPT | Mod: CPTII,S$GLB,, | Performed by: INTERNAL MEDICINE

## 2023-04-21 PROCEDURE — 93005 ELECTROCARDIOGRAM TRACING: CPT

## 2023-04-21 RX ORDER — METOPROLOL SUCCINATE 50 MG/1
50 TABLET, EXTENDED RELEASE ORAL DAILY
Qty: 30 TABLET | Refills: 3 | Status: SHIPPED | OUTPATIENT
Start: 2023-04-21 | End: 2023-08-29 | Stop reason: SDUPTHER

## 2023-04-21 RX ORDER — AMLODIPINE BESYLATE 5 MG/1
5 TABLET ORAL DAILY
Qty: 30 TABLET | Refills: 3 | Status: SHIPPED | OUTPATIENT
Start: 2023-04-21 | End: 2023-08-29 | Stop reason: SDUPTHER

## 2023-04-21 RX ORDER — APIXABAN 2.5 MG/1
2.5 TABLET, FILM COATED ORAL 2 TIMES DAILY
Qty: 180 TABLET | Refills: 1 | Status: SHIPPED | OUTPATIENT
Start: 2023-04-21

## 2023-04-21 RX ORDER — DICLOFENAC SODIUM 10 MG/G
2 GEL TOPICAL 2 TIMES DAILY
Qty: 60 G | Refills: 2 | Status: SHIPPED | OUTPATIENT
Start: 2023-04-21

## 2023-04-21 NOTE — PROGRESS NOTES
Subjective:   Patient ID:  Ezequiel Vegas is a 78 y.o. female who presents for cardiac consult of No chief complaint on file.      HPI  The patient came in today for cardiac consult of No chief complaint on file.    Ezequiel Vegas is a 78 y.o. female pt with newly diagnosed AFib, HTN, OA, CKD3, HLD, tobacco use presents for CV eval of Afib    Reason for Hospitalization   78-year-old female with a PMH of HTN and arthritis who presented to the ED earlier this evening after her family noticed she had not been eating or drinking for ~1 week and had some foul-smelling urine. On my exam, there is no family at bedside and the patient is a rather poor historian so history has mainly come from chart review. After noticing the symptoms, the family called EMS. On EMS arrival, her heart rate was in the 180s-200s improved to the 140s with 1 L IVF in route to the ED. On further probing, the patient does admit to cough that has been progressive over the last few days. It is productive of a gray/yellow sputum and associated with some chills. She denies other associated symptoms. No exacerbating or alleviating factors.    Multiple significant lab abnormalities including: Sodium 148, creatinine 1.99 troponin 0.07, lactic acid 8.6 --> 3.1, WBC 20.6. CT head unremarkable. CXR with pneumonia in the left lower lung zone and a small left-sided pleural effusion.    4/1 feeling well; tolerating diet. Worked with PT. No chest pain sob.  helping her with placement.   4/2 feeling ok. Reports toe pain (no ulcer, no hx injury). No chest pain sob.   4/3 reports toe pain still significant. Xr shows hallus valgus. Orthopedics consulted and recommended conservative management, post op boot. No chest pain sob.   4/4 feeling ok. No chest pain sob.   4/5 no chest pain ob. Feeling well. Working with PT. Motivated to continue working in bed when PT is not around.   4/6 no issue. Now family wants to go home instead of [facility] placement.    4/7 received most equipment. Family reports they can manage at home until wheelchair arrives next day. No chest pain sob. Feeling well. Exercising in bed.       4/21/23  Pt was admitted for sepsis sec to PNA went into Afib sec to sepsis/KENA, she had cardiology consulted and started on Eliqius and BB.     She has been living with niece now, is gaining some weight. She weighed 99 lbs at hospital.   BP today low 104/62, HR 58. BMI 18 - 106 lbs.     Patient feels  no leg swelling, no PND, no dizziness, no syncope, no CNS symptoms.    Patient is compliant with medications.    March 2023   CONCLUSIONS:   1. The left ventricle is small. Normal left ventricular systolic function. LVEF 55 - 65%.   Normal diastolic function.   2. Normal right ventricular size. Severe right ventricular hypokinesis.   3. Mild mitral valve regurgitation. Moderate mitral annular calcification.   4. Mild to moderate aortic valve regurgitation.   5. Mild to moderate tricuspid valve regurgitation.      Past Medical History:   Diagnosis Date    Allergy     Cataract     Gout        Past Surgical History:   Procedure Laterality Date    CATARACT EXTRACTION Right 05/10/2018    HYSTERECTOMY      NEL/BSO    VARICOSE VEIN SURGERY         Social History     Tobacco Use    Smoking status: Never    Smokeless tobacco: Never    Tobacco comments:     occasionally   Substance Use Topics    Alcohol use: Yes     Comment: occasional    Drug use: No       Family History   Problem Relation Age of Onset    Cervical cancer Mother     Glaucoma Mother     Esophageal cancer Mother     Hypertension Mother     No Known Problems Father     Prostate cancer Maternal Uncle     Lupus Other     Kidney disease Other     Stroke Neg Hx     Diabetes Neg Hx        Patient's Medications   New Prescriptions    No medications on file   Previous Medications    BENZONATATE (TESSALON) 200 MG CAPSULE    Take 200 mg by mouth 3 (three) times daily as needed for Cough.    DICLOFENAC SODIUM  (VOLTAREN) 1 % GEL    Apply 2 g topically 2 (two) times daily.    ELIQUIS 2.5 MG TAB    Take 2.5 mg by mouth 2 (two) times daily.    HYDROCODONE-ACETAMINOPHEN (NORCO) 5-325 MG PER TABLET    Take 1 tablet by mouth 4 (four) times daily as needed.    METOPROLOL SUCCINATE (TOPROL-XL) 50 MG 24 HR TABLET    Take 50 mg by mouth.   Modified Medications    Modified Medication Previous Medication    AMLODIPINE (NORVASC) 5 MG TABLET amLODIPine (NORVASC) 10 MG tablet       Take 1 tablet (5 mg total) by mouth once daily.    Take 10 mg by mouth.   Discontinued Medications    No medications on file       Review of Systems   Constitutional: Negative.    HENT: Negative.     Eyes: Negative.    Respiratory: Negative.     Cardiovascular: Negative.    Gastrointestinal: Negative.    Genitourinary: Negative.    Musculoskeletal: Negative.    Skin: Negative.    Neurological: Negative.    Endo/Heme/Allergies: Negative.    Psychiatric/Behavioral: Negative.     All 12 systems otherwise negative.    Wt Readings from Last 3 Encounters:   04/21/23 48.2 kg (106 lb 4.2 oz)   04/21/23 48.1 kg (106 lb)   04/17/23 48.2 kg (106 lb 4.2 oz)     Temp Readings from Last 3 Encounters:   04/17/23 98.1 °F (36.7 °C)   10/13/22 97 °F (36.1 °C)   02/08/21 97.2 °F (36.2 °C) (Tympanic)     BP Readings from Last 3 Encounters:   04/21/23 104/62   04/17/23 110/60   10/13/22 118/82     Pulse Readings from Last 3 Encounters:   04/21/23 (!) 58   04/17/23 (!) 56   10/13/22 102       /62   Pulse (!) 58   Wt 48.2 kg (106 lb 4.2 oz)   BMI 18.82 kg/m²     Objective:   Physical Exam  Vitals and nursing note reviewed.   Constitutional:       General: She is not in acute distress.     Appearance: She is well-developed. She is not diaphoretic.   HENT:      Head: Normocephalic and atraumatic.      Nose: Nose normal.   Eyes:      General: No scleral icterus.     Conjunctiva/sclera: Conjunctivae normal.   Neck:      Thyroid: No thyromegaly.      Vascular: No JVD.    Cardiovascular:      Rate and Rhythm: Rhythm irregular.      Heart sounds: S1 normal and S2 normal. Murmur heard.     No friction rub. No gallop. No S3 or S4 sounds.   Pulmonary:      Effort: Pulmonary effort is normal. No respiratory distress.      Breath sounds: Normal breath sounds. No stridor. No wheezing or rales.   Chest:      Chest wall: No tenderness.   Abdominal:      General: Bowel sounds are normal. There is no distension.      Palpations: Abdomen is soft. There is no mass.      Tenderness: There is no abdominal tenderness. There is no rebound.   Genitourinary:     Comments: Deferred  Musculoskeletal:         General: No tenderness or deformity. Normal range of motion.      Cervical back: Normal range of motion and neck supple.   Lymphadenopathy:      Cervical: No cervical adenopathy.   Skin:     General: Skin is warm and dry.      Coloration: Skin is not pale.      Findings: No erythema or rash.   Neurological:      Mental Status: She is alert and oriented to person, place, and time.      Motor: No abnormal muscle tone.      Coordination: Coordination normal.   Psychiatric:         Behavior: Behavior normal.         Thought Content: Thought content normal.         Judgment: Judgment normal.       Lab Results   Component Value Date     04/17/2023    K 4.5 04/17/2023     04/17/2023    CO2 23 04/17/2023    BUN 15 04/17/2023    CREATININE 1.0 04/17/2023    GLU 78 04/17/2023    HGBA1C 5.2 12/11/2020     (H) 04/17/2023    ALT 82 (H) 04/17/2023    ALBUMIN 3.0 (L) 04/17/2023    PROT 8.1 04/17/2023    BILITOT 0.4 04/17/2023    WBC 9.36 04/17/2023    HGB 11.7 (L) 04/17/2023    HCT 39.0 04/17/2023    MCV 96 04/17/2023     04/17/2023    TSH 0.669 10/13/2022    CHOL 211 (H) 10/13/2022    HDL 71 10/13/2022    LDLCALC 125.2 10/13/2022    TRIG 74 10/13/2022     Assessment:      1. Atrial fibrillation, unspecified type    2. Hospital discharge follow-up    3. Dyslipidemia    4. Primary  hypertension    5. Stage 3a chronic kidney disease    6. Debility    7. Primary osteoarthritis of both knees        Plan:     AFib, new onset - post PNA - now in NSR  - cont Eliquis 2.5 mg BID and BB  - order 14 day Vital monitor  - repeat ECHO ordered - in 1 month - RV dysfunction prior echo     2. HTN  - titrate meds - dec Norvasc to 5mg     3. CKD3  - cont to monitor    4. Debility, OA  - cont tx per PCP - may need PT/OT    Thank you for allowing me to participate in this patient's care. Please do not hesitate to contact me with any questions or concerns. Consult note has been forwarded to the referral physician.

## 2023-04-21 NOTE — PROGRESS NOTES
PODIATRIC MEDICINE AND SURGERY     CHIEF COMPLAINT  Chief Complaint   Patient presents with    Foot Problem     C/o left great toe deformity, x 1 year, 0 injury, rates pain 7/10, pain can get up to 10/10, x-rays today, fungus on the right great toenail, non-diabetic, wears casual shoes and socks, last seen PCP Dr. Matthews on 04/17/23         HPI    SUBJECTIVE: Ezequiel Vegas is a 78 y.o. female who  has a past medical history of Allergy, Cataract, and Gout. Doloriuspresents to clinic for high risk foot exam and care. Pt admits to cold sensation in left leg. She denies leg cramps. She has recently stopped smoking. She has unknown duration of smoking history for >60 years. Patient admits to painful toenails aggravated by increased weight bearing, shoe gear, and pressure. \States pain is relieved with routine debridements. Patient has no other pedal complaints at this time.    This patient has documented high risk feet requiring routine maintenance secondary to arterial insufficiency and those secondary complications, as mentioned.      HgA1c:   Hemoglobin A1C   Date Value Ref Range Status   12/11/2020 5.2 4.0 - 5.6 % Final     Comment:     ADA Screening Guidelines:  5.7-6.4%  Consistent with prediabetes  >or=6.5%  Consistent with diabetes  High levels of fetal hemoglobin interfere with the HbA1C  assay. Heterozygous hemoglobin variants (HbS, HgC, etc)do  not significantly interfere with this assay.   However, presence of multiple variants may affect accuracy.           PMH  Past Medical History:   Diagnosis Date    Allergy     Cataract     Gout      Patient Active Problem List   Diagnosis    CKD (chronic kidney disease) stage 3, GFR 30-59 ml/min    Dyslipidemia    Memory changes    Tobacco use    Primary osteoarthritis of both knees    A-fib    HTN (hypertension)       MEDS  Current Outpatient Medications on File Prior to Visit   Medication Sig Dispense Refill    amLODIPine (NORVASC) 10 MG tablet Take 10 mg by  "mouth.      benzonatate (TESSALON) 200 MG capsule Take 200 mg by mouth 3 (three) times daily as needed for Cough.      ELIQUIS 2.5 mg Tab Take 2.5 mg by mouth 2 (two) times daily.      HYDROcodone-acetaminophen (NORCO) 5-325 mg per tablet Take 1 tablet by mouth 4 (four) times daily as needed.      metoprolol succinate (TOPROL-XL) 50 MG 24 hr tablet Take 50 mg by mouth.       No current facility-administered medications on file prior to visit.       PSH     Past Surgical History:   Procedure Laterality Date    CATARACT EXTRACTION Right 05/10/2018    HYSTERECTOMY      NEL/BSO    VARICOSE VEIN SURGERY          ALL  Review of patient's allergies indicates:   Allergen Reactions    Penicillins      Other reaction(s): Itching       SOC     Social History     Tobacco Use    Smoking status: Never    Smokeless tobacco: Never    Tobacco comments:     occasionally   Substance Use Topics    Alcohol use: Yes     Comment: occasional    Drug use: No         Family HX    Family History   Problem Relation Age of Onset    Cervical cancer Mother     Glaucoma Mother     Esophageal cancer Mother     Hypertension Mother     No Known Problems Father     Prostate cancer Maternal Uncle     Lupus Other     Kidney disease Other     Stroke Neg Hx     Diabetes Neg Hx             REVIEW OF SYSTEMS  General: Denies any fever or chills  Chest: Denies shortness of breath, wheezing, coughing, or sputum production  Heart: Denies chest pain.  As noted above and per history of current illness above, otherwise negative in the remainder of the 14 systems.     PHYSICAL EXAM  Vitals:    04/21/23 1336   Weight: 48.1 kg (106 lb)   Height: 5' 3" (1.6 m)   PainSc:   7       GEN:  This patient is well-developed, well-nourished and appears stated age, well-oriented to person, place and time, and cooperative and pleasant on today's visit.      LOWER EXTREMITY    VASCULAR  DP pedal pulse 0/4 RIGHT, LEFT0/4   PT pedal pulse 1/4 RIGHT, LEFT1/4  Capillary refill time " immediate to the toes.   Feet are warm to the touch. Skin temperature warm to warm from proximally to distally   There are varicosities, telangiectasias noted to bilateral foot and ankle regions.   There are no ecchymoses noted to bilateral foot and ankle regions.   There is no gross lower extremity edema.    DERMATOLOGIC  Skin moist with healthy texture and turgor.  Thickened, dystrophic, elongated, discolored toenails with subungal debris 1-5 b/l  There are no open ulcerations, lacerations, or fissures to bilateral foot and ankle regions. There are no signs of infection as there is no erythema, no proximal-extending lymphangiitis, no fluctuance, or crepitus noted on palpation to bilateral foot and ankle regions.   There is no interdigital maceration.   There are hyperkeratotic lesions noted to feet.    NEUROLOGIC  Protective sensation intact at 10/10 sites upon examination with [place] Weinsten 5.07 g monofilament.  Propioception intact at 1st MTPJ b/l.  Achilles and patellar deep tendon reflexes intact  Babinski reflex absent    ORTHOPEDIC/BIOMECHANICAL  HAV b/l with hallux extensus on LEFT. Muscle strength is 5/5 for foot inverters, everters, plantarflexors, and dorsiflexors. Muscle tone is normal.  I  nspection/palpation of bone, joints and muscles unremarkable.    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  Dispense: 60 g; Refill: 2        PLAN  -The patient was examined and evaulated  -Discuss presenting problems, etiology, pathologic processes and management options with patient today.   -I counseled the patient on their conditions, their implications and  medical management. An in depth discussion on diabetic management, risk prevention, amputation prevention verbally and provided educational literature in written format.  -With patient's permission, the elongated onychomycotic toenails, as outlined in the physical examination, were sharply debrided/trimmed with a double action nail nipper to their soft tissue attachment. If indicated, the nails were then smoothed down in thickness with an harmeet board to facilitate in further debridement removing all offending nail and subungual debris. Patient relates relief following the procedure.     Currently bunion deformities are asymptomatic and patient was guided on accommodating bunions appropriately with wider toe box shoes. Patient was also guided on over-the-counter anti-inflammatories and offloading cushion padding for any acute flareups. If conservative treatment fails, then due to osseous deformity we will discuss surgical intervention.    Will order arterial studies to evaluate blood flow.    Disclaimer: This note was partially prepared using a voice recognition system and is likely to have sound alike errors within the text.        Future Appointments   Date Time Provider Department Center   5/18/2023 11:00 AM MD [facility] MED [address]       Report Electronically Signed By:     Taryn Parsons DPM   Podiatry  [facility]  4/21/2023

## 2023-04-25 ENCOUNTER — HOSPITAL ENCOUNTER (OUTPATIENT)
Dept: RADIOLOGY | Facility: HOSPITAL | Age: 78
Discharge: HOME | End: 2023-04-25
Attending: PODIATRIST
Payer: MEDICARE

## 2023-04-25 DIAGNOSIS — I73.9 ARTERIAL INSUFFICIENCY OF LOWER EXTREMITY: ICD-10-CM

## 2023-04-25 PROCEDURE — 93925 LOWER EXTREMITY STUDY: CPT | Mod: 26,,, | Performed by: RADIOLOGY

## 2023-04-25 PROCEDURE — 93925 US LOWER EXTREMITY ARTERIES BILATERAL: ICD-10-PCS | Mod: 26,,, | Performed by: RADIOLOGY

## 2023-04-25 PROCEDURE — 93925 LOWER EXTREMITY STUDY: CPT | Mod: TC,PN

## 2023-04-28 ENCOUNTER — OFFICE VISIT (OUTPATIENT)
Dept: CARDIOLOGY | Facility: CLINIC | Age: 78
End: 2023-04-28
Payer: MEDICARE

## 2023-04-28 VITALS
WEIGHT: 107.81 LBS | HEART RATE: 62 BPM | BODY MASS INDEX: 19.1 KG/M2 | OXYGEN SATURATION: 94 % | DIASTOLIC BLOOD PRESSURE: 60 MMHG | HEIGHT: 63 IN | SYSTOLIC BLOOD PRESSURE: 124 MMHG

## 2023-04-28 DIAGNOSIS — E78.5 DYSLIPIDEMIA: ICD-10-CM

## 2023-04-28 DIAGNOSIS — N18.31 STAGE 3A CHRONIC KIDNEY DISEASE: ICD-10-CM

## 2023-04-28 DIAGNOSIS — Z72.0 TOBACCO USE: ICD-10-CM

## 2023-04-28 DIAGNOSIS — R53.81 DEBILITY: ICD-10-CM

## 2023-04-28 DIAGNOSIS — I10 PRIMARY HYPERTENSION: ICD-10-CM

## 2023-04-28 DIAGNOSIS — I73.9 PVD (PERIPHERAL VASCULAR DISEASE): Primary | ICD-10-CM

## 2023-04-28 DIAGNOSIS — I48.91 ATRIAL FIBRILLATION, UNSPECIFIED TYPE: ICD-10-CM

## 2023-04-28 PROCEDURE — 1160F RVW MEDS BY RX/DR IN RCRD: CPT | Mod: CPTII,S$GLB,, | Performed by: INTERNAL MEDICINE

## 2023-04-28 PROCEDURE — 3074F PR MOST RECENT SYSTOLIC BLOOD PRESSURE < 130 MM HG: ICD-10-PCS | Mod: CPTII,S$GLB,, | Performed by: INTERNAL MEDICINE

## 2023-04-28 PROCEDURE — 1125F AMNT PAIN NOTED PAIN PRSNT: CPT | Mod: CPTII,S$GLB,, | Performed by: INTERNAL MEDICINE

## 2023-04-28 PROCEDURE — 3078F PR MOST RECENT DIASTOLIC BLOOD PRESSURE < 80 MM HG: ICD-10-PCS | Mod: CPTII,S$GLB,, | Performed by: INTERNAL MEDICINE

## 2023-04-28 PROCEDURE — 3288F FALL RISK ASSESSMENT DOCD: CPT | Mod: CPTII,S$GLB,, | Performed by: INTERNAL MEDICINE

## 2023-04-28 PROCEDURE — 1125F PR PAIN SEVERITY QUANTIFIED, PAIN PRESENT: ICD-10-PCS | Mod: CPTII,S$GLB,, | Performed by: INTERNAL MEDICINE

## 2023-04-28 PROCEDURE — 3074F SYST BP LT 130 MM HG: CPT | Mod: CPTII,S$GLB,, | Performed by: INTERNAL MEDICINE

## 2023-04-28 PROCEDURE — 1160F PR REVIEW ALL MEDS BY PRESCRIBER/CLIN PHARMACIST DOCUMENTED: ICD-10-PCS | Mod: CPTII,S$GLB,, | Performed by: INTERNAL MEDICINE

## 2023-04-28 PROCEDURE — 3288F PR FALLS RISK ASSESSMENT DOCUMENTED: ICD-10-PCS | Mod: CPTII,S$GLB,, | Performed by: INTERNAL MEDICINE

## 2023-04-28 PROCEDURE — 99999 PR PBB SHADOW E&M-EST. PATIENT-LVL III: ICD-10-PCS | Mod: PBBFAC,,, | Performed by: INTERNAL MEDICINE

## 2023-04-28 PROCEDURE — 1101F PR PT FALLS ASSESS DOC 0-1 FALLS W/OUT INJ PAST YR: ICD-10-PCS | Mod: CPTII,S$GLB,, | Performed by: INTERNAL MEDICINE

## 2023-04-28 PROCEDURE — 1159F MED LIST DOCD IN RCRD: CPT | Mod: CPTII,S$GLB,, | Performed by: INTERNAL MEDICINE

## 2023-04-28 PROCEDURE — 99204 PR OFFICE/OUTPT VISIT, NEW, LEVL IV, 45-59 MIN: ICD-10-PCS | Mod: S$GLB,,, | Performed by: INTERNAL MEDICINE

## 2023-04-28 PROCEDURE — 99999 PR PBB SHADOW E&M-EST. PATIENT-LVL III: CPT | Mod: PBBFAC,,, | Performed by: INTERNAL MEDICINE

## 2023-04-28 PROCEDURE — 99204 OFFICE O/P NEW MOD 45 MIN: CPT | Mod: S$GLB,,, | Performed by: INTERNAL MEDICINE

## 2023-04-28 PROCEDURE — 3078F DIAST BP <80 MM HG: CPT | Mod: CPTII,S$GLB,, | Performed by: INTERNAL MEDICINE

## 2023-04-28 PROCEDURE — 1101F PT FALLS ASSESS-DOCD LE1/YR: CPT | Mod: CPTII,S$GLB,, | Performed by: INTERNAL MEDICINE

## 2023-04-28 PROCEDURE — 1159F PR MEDICATION LIST DOCUMENTED IN MEDICAL RECORD: ICD-10-PCS | Mod: CPTII,S$GLB,, | Performed by: INTERNAL MEDICINE

## 2023-04-28 RX ORDER — CILOSTAZOL 50 MG/1
50 TABLET ORAL 2 TIMES DAILY
Qty: 60 TABLET | Refills: 11 | Status: SHIPPED | OUTPATIENT
Start: 2023-04-28 | End: 2024-04-27

## 2023-04-28 NOTE — PROGRESS NOTES
Subjective:   Patient ID:  Ezequiel Vegas is a 78 y.o. female who presents for evaluation of No chief complaint on file.      HPI  DR DIETZ 4/21/2023  The patient came in today for cardiac consult of No chief complaint on file.     Ezequiel Vegas is a 78 y.o. female pt with newly diagnosed AFib, HTN, OA, CKD3, HLD, tobacco use presents for CV eval of Afib     Reason for Hospitalization   78-year-old female with a PMH of HTN and arthritis who presented to the ED earlier this evening after her family noticed she had not been eating or drinking for ~1 week and had some foul-smelling urine. On my exam, there is no family at bedside and the patient is a rather poor historian so history has mainly come from chart review. After noticing the symptoms, the family called EMS. On EMS arrival, her heart rate was in the 180s-200s improved to the 140s with 1 L IVF in route to the ED. On further probing, the patient does admit to cough that has been progressive over the last few days. It is productive of a gray/yellow sputum and associated with some chills. She denies other associated symptoms. No exacerbating or alleviating factors.    Multiple significant lab abnormalities including: Sodium 148, creatinine 1.99 troponin 0.07, lactic acid 8.6 --> 3.1, WBC 20.6. CT head unremarkable. CXR with pneumonia in the left lower lung zone and a small left-sided pleural effusion.    4/1 feeling well; tolerating diet. Worked with PT. No chest pain sob.  helping her with placement.   4/2 feeling ok. Reports toe pain (no ulcer, no hx injury). No chest pain sob.   4/3 reports toe pain still significant. Xr shows hallus valgus. Orthopedics consulted and recommended conservative management, post op boot. No chest pain sob.   4/4 feeling ok. No chest pain sob.   4/5 no chest pain ob. Feeling well. Working with PT. Motivated to continue working in bed when PT is not around.   4/6 no issue. Now family wants to go home instead of  [facility] placement.   4/7 received most equipment. Family reports they can manage at home until wheelchair arrives next day. No chest pain sob. Feeling well. Exercising in bed.        4/21/23  Pt was admitted for sepsis sec to [facility] went into Afib sec to sepsis/[facility], she had cardiology consulted and started on Eliqius and BB.      She has been living with niece now, is gaining some weight. She weighed 99 lbs at hospital.   BP today low 104/62, HR 58. BMI 18 - 106 lbs.      Patient feels  no leg swelling, no PND, no dizziness, no syncope, no CNS symptoms.     Patient is compliant with medications.     March 2023   CONCLUSIONS:   1. The left ventricle is small. Normal left ventricular systolic function. LVEF 55 - 65%.   Normal diastolic function.   2. Normal right ventricular size. Severe right ventricular hypokinesis.   3. Mild mitral valve regurgitation. Moderate mitral annular calcification.   4. Mild to moderate aortic valve regurgitation.   5. Mild to moderate tricuspid valve regurgitation.      4/28/2023   REFERRED FROM DR BUSCH for leg pain   She has been complaining of episodic pain in feet knees and sometimes in groin. The legs are cold she has a pinch in groin legs and feet at nite. Has no back pain she quits smoking 1 month.she can walk w/o any calf claudication the pain is intermittent. Has no discoloration in feet or numbness . Has toe pain and ankle  she had toe nail trimmed helped the pain. She had arterial study done showed bilateral severe sfa disease with monophasic flow distally.       Past Medical History:   Diagnosis Date    Allergy     Cataract     Gout     PVD (peripheral vascular disease) 4/28/2023       Past Surgical History:   Procedure Laterality Date    CATARACT EXTRACTION Right 05/10/2018    HYSTERECTOMY      NEL/BSO    VARICOSE VEIN SURGERY         Social History     Tobacco Use    Smoking status: Never    Smokeless tobacco: Never    Tobacco comments:     occasionally   Substance Use  Topics    Alcohol use: Yes     Comment: occasional    Drug use: No       Family History   Problem Relation Age of Onset    Cervical cancer Mother     Glaucoma Mother     Esophageal cancer Mother     Hypertension Mother     No Known Problems Father     Prostate cancer Maternal Uncle     Lupus Other     Kidney disease Other     Stroke Neg Hx     Diabetes Neg Hx        Current Outpatient Medications   Medication Sig    amLODIPine (NORVASC) 5 MG tablet Take 1 tablet (5 mg total) by mouth once daily.    benzonatate (TESSALON) 200 MG capsule Take 200 mg by mouth 3 (three) times daily as needed for Cough.    diclofenac sodium (VOLTAREN) 1 % Gel Apply 2 g topically 2 (two) times daily.    ELIQUIS 2.5 mg Tab Take 1 tablet (2.5 mg total) by mouth 2 (two) times daily.    HYDROcodone-acetaminophen (NORCO) 5-325 mg per tablet Take 1 tablet by mouth 4 (four) times daily as needed.    metoprolol succinate (TOPROL-XL) 50 MG 24 hr tablet Take 1 tablet (50 mg total) by mouth once daily.     No current facility-administered medications for this visit.     Current Outpatient Medications on File Prior to Visit   Medication Sig    amLODIPine (NORVASC) 5 MG tablet Take 1 tablet (5 mg total) by mouth once daily.    benzonatate (TESSALON) 200 MG capsule Take 200 mg by mouth 3 (three) times daily as needed for Cough.    diclofenac sodium (VOLTAREN) 1 % Gel Apply 2 g topically 2 (two) times daily.    ELIQUIS 2.5 mg Tab Take 1 tablet (2.5 mg total) by mouth 2 (two) times daily.    HYDROcodone-acetaminophen (NORCO) 5-325 mg per tablet Take 1 tablet by mouth 4 (four) times daily as needed.    metoprolol succinate (TOPROL-XL) 50 MG 24 hr tablet Take 1 tablet (50 mg total) by mouth once daily.     No current facility-administered medications on file prior to visit.       Review of patient's allergies indicates:   Allergen Reactions    Penicillins      Other reaction(s): Itching       Review of Systems   Constitutional: Negative for  malaise/fatigue.   Eyes:  Negative for blurred vision.   Cardiovascular:  Negative for chest pain, claudication, cyanosis, dyspnea on exertion, irregular heartbeat, leg swelling, near-syncope, orthopnea, palpitations and paroxysmal nocturnal dyspnea.   Respiratory:  Negative for cough, hemoptysis and shortness of breath.    Hematologic/Lymphatic: Negative for bleeding problem. Does not bruise/bleed easily.   Skin:  Negative for dry skin and itching.   Musculoskeletal:  Positive for joint pain and stiffness. Negative for falls, muscle weakness and myalgias.        Ankle pain foot pain coldness.    Gastrointestinal:  Negative for abdominal pain, diarrhea, heartburn, hematemesis, hematochezia and melena.   Genitourinary:  Negative for flank pain and hematuria.   Neurological:  Negative for dizziness, focal weakness, headaches, light-headedness, numbness, paresthesias, seizures and weakness.   Psychiatric/Behavioral:  Negative for altered mental status and memory loss. The patient is not nervous/anxious.    Allergic/Immunologic: Negative for hives.     Objective:   Physical Exam  Constitutional:       General: She is not in acute distress.     Appearance: Normal appearance. She is well-developed. She is not ill-appearing.   HENT:      Head: Normocephalic and atraumatic.   Eyes:      General: No scleral icterus.     Pupils: Pupils are equal, round, and reactive to light.   Neck:      Thyroid: No thyromegaly.      Vascular: Normal carotid pulses. No carotid bruit, hepatojugular reflux or JVD.      Trachea: No tracheal deviation.   Cardiovascular:      Rate and Rhythm: Normal rate and regular rhythm.      Pulses:           Carotid pulses are 2+ on the right side and 2+ on the left side.       Radial pulses are 2+ on the right side and 2+ on the left side.        Femoral pulses are 2+ on the right side and 2+ on the left side.       Popliteal pulses are 0 on the right side and 0 on the left side.        Dorsalis pedis  "pulses are 0 on the right side and 0 on the left side.        Posterior tibial pulses are 0 on the right side and 0 on the left side.      Heart sounds: Normal heart sounds. No murmur heard.    No friction rub. No gallop.   Pulmonary:      Effort: Pulmonary effort is normal. No respiratory distress.      Breath sounds: Normal breath sounds. No wheezing, rhonchi or rales.   Chest:      Chest wall: No tenderness.   Abdominal:      General: Bowel sounds are normal. There is no abdominal bruit.      Palpations: Abdomen is soft. There is no hepatomegaly or pulsatile mass.      Tenderness: There is no abdominal tenderness.   Musculoskeletal:      Right shoulder: No deformity.      Cervical back: Normal range of motion and neck supple.      Comments: Mild left foot swelling  scar well healed.    Skin:     General: Skin is warm and dry.      Findings: No erythema or rash.      Nails: There is no clubbing.   Neurological:      Mental Status: She is alert and oriented to person, place, and time.      Cranial Nerves: No cranial nerve deficit.      Coordination: Coordination normal.   Psychiatric:         Speech: Speech normal.         Behavior: Behavior normal.     Vitals:    04/28/23 1504   BP: 124/60   Pulse: 62   SpO2: (!) 94%   Weight: 48.9 kg (107 lb 12.9 oz)   Height: 5' 3" (1.6 m)     Lab Results   Component Value Date    CHOL 211 (H) 10/13/2022    CHOL 225 (H) 12/11/2020    CHOL 176 07/01/2019     Body mass index is 19.1 kg/m².   Lab Results   Component Value Date    HGBA1C 5.2 12/11/2020      BMP  Lab Results   Component Value Date     04/17/2023    K 4.5 04/17/2023     04/17/2023    CO2 23 04/17/2023    BUN 15 04/17/2023    CREATININE 1.0 04/17/2023    CALCIUM 10.2 04/17/2023    ANIONGAP 10 04/17/2023    EGFRNORACEVR 57.0 (A) 04/17/2023      Lab Results   Component Value Date    HDL 71 10/13/2022    HDL 76 (H) 12/11/2020    HDL 70 07/01/2019     Lab Results   Component Value Date    LDLCALC 125.2 " 10/13/2022    LDLCALC 131.0 12/11/2020    LDLCALC 89.2 07/01/2019     Lab Results   Component Value Date    TRIG 74 10/13/2022    TRIG 90 12/11/2020    TRIG 84 07/01/2019     Lab Results   Component Value Date    CHOLHDL 33.6 10/13/2022    CHOLHDL 33.8 12/11/2020    CHOLHDL 39.8 07/01/2019       Chemistry        Component Value Date/Time     04/17/2023 1510    K 4.5 04/17/2023 1510     04/17/2023 1510    CO2 23 04/17/2023 1510    BUN 15 04/17/2023 1510    CREATININE 1.0 04/17/2023 1510    GLU 78 04/17/2023 1510        Component Value Date/Time    CALCIUM 10.2 04/17/2023 1510    ALKPHOS 110 04/17/2023 1510     (H) 04/17/2023 1510    ALT 82 (H) 04/17/2023 1510    BILITOT 0.4 04/17/2023 1510    ESTGFRAFRICA 56.0 (A) 02/08/2021 1500    EGFRNONAA 48.5 (A) 02/08/2021 1500          Lab Results   Component Value Date    TSH 0.669 10/13/2022     No results found for: INR, PROTIME  Lab Results   Component Value Date    WBC 9.36 04/17/2023    HGB 11.7 (L) 04/17/2023    HCT 39.0 04/17/2023    MCV 96 04/17/2023     04/17/2023     BNP  @LABRCNTIP(BNP,BNPTRIAGEBLO)@  CrCl cannot be calculated (Patient's most recent lab result is older than the maximum 7 days allowed.).      Narrative & Impression  EXAMINATION:  US LOWER EXTREMITY ARTERIES BILATERAL     CLINICAL HISTORY:  Peripheral vascular disease, unspecified     TECHNIQUE:  Multiple static images are submitted for interpretation with color flow and spectral doppler imaging.     COMPARISON:  None     FINDINGS:  The following pertains to the left lower extremity.  The common femoral and profunda femoral arteries have triphasic arterial waveforms.  The common femoral artery has a peak systolic velocity of 157 centimeters/second.  The superficial femoral, popliteal, posterior tibial, peroneal, anterior tibial, and dorsalis pedis arteries have monophasic arterial waveforms.  The peroneal artery has a peak systolic velocity of 26 centimeters/second.  The  proximal portion of the anterior tibial artery has a peak systolic velocity of 203 centimeters/second.     The following pertains to the right lower extremity.  The common femoral and profunda femoral arteries have triphasic arterial waveforms.  The common femoral artery has a peak systolic velocity of 145 centimeters/second.  The midportion of the superficial femoral artery is occluded.  The distal portion of the superficial femoral artery has a monophasic arterial waveform with a peak systolic velocity of 32 centimeters/second.  The popliteal, posterior tibial, peroneal, anterior tibial, and dorsalis pedis arteries have monophasic arterial waveforms.     Impression:     1. Severe peripheral arterial disease in the right lower extremity.  The midportion of the right superficial femoral artery is occluded. The distal portion of the right superficial femoral artery has a monophasic arterial waveform with a peak systolic velocity of 32 centimeters/second.  2. Moderate peripheral disease in the left lower extremity.  The findings are characteristic of a significant stenosis in the proximal portion of the left anterior tibial artery.        Electronically signed by: Asif Valles MD  Date:                                            04/25/2023  Time:                                           16:11           Exam Ended: 04/25/23 15:45 Last Resulted: 04/25/23 16:11           Assessment:     1. Atrial fibrillation, unspecified type    2. Dyslipidemia    3. Primary hypertension    4. Stage 3a chronic kidney disease    5. Debility    6. Tobacco use    7. PVD (peripheral vascular disease)    Has asymptomatic pvd with arthritic symptoms. She has no claudication by history her left foot swellling is arthritic venous insufficiency. She will benefit from smoking cessation like she is doing in addition will benefit from pletal. And will need to have esther test and toe pressures to assess perfusion. And healing potential. She was  counseled about exercise and foot precaution   Afib on eliquis continue same  Htn controlled continue same.  Fraility gaining strength continue same       Plan:     Ximena toe pressures.  Pletal 50 mg po bid   F/u in 3 months

## 2023-05-05 ENCOUNTER — HOSPITAL ENCOUNTER (OUTPATIENT)
Dept: CARDIOLOGY | Facility: HOSPITAL | Age: 78
Discharge: HOME | End: 2023-05-05
Attending: INTERNAL MEDICINE
Payer: MEDICARE

## 2023-05-05 VITALS — HEIGHT: 63 IN | BODY MASS INDEX: 18.96 KG/M2 | WEIGHT: 107 LBS

## 2023-05-05 DIAGNOSIS — I73.9 PVD (PERIPHERAL VASCULAR DISEASE): ICD-10-CM

## 2023-05-05 LAB
LEFT ABI: 0.91
LEFT ARM BP: 158 MMHG
LEFT DORSALIS PEDIS: 135 MMHG
LEFT POSTERIOR TIBIAL: 144 MMHG
LEFT TBI: 0.62
LEFT TOE PRESSURE: 98 MMHG
RIGHT ABI: 0.54
RIGHT ARM BP: 144 MMHG
RIGHT DORSALIS PEDIS: 86 MMHG
RIGHT POSTERIOR TIBIAL: 74 MMHG
RIGHT TBI: 0.1
RIGHT TOE PRESSURE: 16 MMHG

## 2023-05-05 PROCEDURE — 93922 UPR/L XTREMITY ART 2 LEVELS: CPT

## 2023-05-05 PROCEDURE — 93922 UPR/L XTREMITY ART 2 LEVELS: CPT | Mod: 26,,, | Performed by: INTERNAL MEDICINE

## 2023-05-05 PROCEDURE — 93922 ANKLE BRACHIAL INDICES (ABI): ICD-10-PCS | Mod: 26,,, | Performed by: INTERNAL MEDICINE

## 2023-05-10 ENCOUNTER — TELEPHONE (OUTPATIENT)
Dept: CARDIOLOGY | Facility: CLINIC | Age: 78
End: 2023-05-10
Payer: MEDICARE

## 2023-05-10 NOTE — TELEPHONE ENCOUNTER
.[facility] for patent to call the office regarding results.          ----- Message from Nikos Cai MD sent at 5/9/2023  5:29 PM CDT -----  She has significant decrease in blood flow needs to stop smoking and take her med.s

## 2023-05-10 NOTE — TELEPHONE ENCOUNTER
Patient's sister and niece was notified of results. All questions were answered. Pt's sister and niece verbalized understanding. Pt's sister and niece will call back with any other questions or concerns.      ----- Message from Nikos Cai MD sent at 5/9/2023  5:29 PM CDT -----  She has significant decrease in blood flow needs to stop smoking and take her med.s

## 2023-05-11 ENCOUNTER — HOSPITAL ENCOUNTER (OUTPATIENT)
Dept: CARDIOLOGY | Facility: HOSPITAL | Age: 78
Discharge: HOME | End: 2023-05-11
Attending: INTERNAL MEDICINE
Payer: MEDICARE

## 2023-05-11 VITALS
WEIGHT: 107 LBS | HEIGHT: 63 IN | SYSTOLIC BLOOD PRESSURE: 124 MMHG | DIASTOLIC BLOOD PRESSURE: 60 MMHG | BODY MASS INDEX: 18.96 KG/M2

## 2023-05-11 DIAGNOSIS — R53.81 DEBILITY: ICD-10-CM

## 2023-05-11 DIAGNOSIS — I10 PRIMARY HYPERTENSION: ICD-10-CM

## 2023-05-11 DIAGNOSIS — E78.5 DYSLIPIDEMIA: ICD-10-CM

## 2023-05-11 DIAGNOSIS — I48.91 ATRIAL FIBRILLATION, UNSPECIFIED TYPE: ICD-10-CM

## 2023-05-11 DIAGNOSIS — N18.31 STAGE 3A CHRONIC KIDNEY DISEASE: ICD-10-CM

## 2023-05-11 LAB
AORTIC ROOT ANNULUS: 2.82 CM
ASCENDING AORTA: 2.68 CM
AV INDEX (PROSTH): 0.66
AV MEAN GRADIENT: 9 MMHG
AV PEAK GRADIENT: 17 MMHG
AV VALVE AREA: 2 CM2
AV VELOCITY RATIO: 0.65
BSA FOR ECHO PROCEDURE: 1.47 M2
CV ECHO LV RWT: 0.49 CM
DOP CALC AO PEAK VEL: 2.05 M/S
DOP CALC AO VTI: 43.5 CM
DOP CALC LVOT AREA: 3 CM2
DOP CALC LVOT DIAMETER: 1.97 CM
DOP CALC LVOT PEAK VEL: 1.33 M/S
DOP CALC LVOT STROKE VOLUME: 87.13 CM3
DOP CALC RVOT PEAK VEL: 0.6 M/S
DOP CALC RVOT VTI: 14.1 CM
DOP CALCLVOT PEAK VEL VTI: 28.6 CM
E WAVE DECELERATION TIME: 262.04 MSEC
E/A RATIO: 0.74
E/E' RATIO: 13.64 M/S
ECHO LV POSTERIOR WALL: 1.01 CM (ref 0.6–1.1)
EJECTION FRACTION: 65 %
FRACTIONAL SHORTENING: 35 % (ref 28–44)
INTERVENTRICULAR SEPTUM: 1.06 CM (ref 0.6–1.1)
IVC DIAMETER: 0.87 CM
IVRT: 97.05 MSEC
LA MAJOR: 5.49 CM
LA MINOR: 5.49 CM
LA WIDTH: 4.1 CM
LEFT ATRIUM SIZE: 4.03 CM
LEFT ATRIUM VOLUME INDEX MOD: 49.1 ML/M2
LEFT ATRIUM VOLUME INDEX: 52.1 ML/M2
LEFT ATRIUM VOLUME MOD: 72.69 CM3
LEFT ATRIUM VOLUME: 77.1 CM3
LEFT INTERNAL DIMENSION IN SYSTOLE: 2.67 CM (ref 2.1–4)
LEFT VENTRICLE DIASTOLIC VOLUME INDEX: 51.16 ML/M2
LEFT VENTRICLE DIASTOLIC VOLUME: 75.71 ML
LEFT VENTRICLE MASS INDEX: 95 G/M2
LEFT VENTRICLE SYSTOLIC VOLUME INDEX: 17.7 ML/M2
LEFT VENTRICLE SYSTOLIC VOLUME: 26.24 ML
LEFT VENTRICULAR INTERNAL DIMENSION IN DIASTOLE: 4.13 CM (ref 3.5–6)
LEFT VENTRICULAR MASS: 140.28 G
LV LATERAL E/E' RATIO: 12.5 M/S
LV SEPTAL E/E' RATIO: 15 M/S
LVOT MG: 3.62 MMHG
LVOT MV: 0.87 CM/S
MV PEAK A VEL: 1.02 M/S
MV PEAK E VEL: 0.75 M/S
MV STENOSIS PRESSURE HALF TIME: 75.99 MS
MV VALVE AREA P 1/2 METHOD: 2.9 CM2
PISA TR MAX VEL: 3.07 M/S
PULM VEIN S/D RATIO: 1.61
PV MEAN GRADIENT: 0.74 MMHG
PV PEAK D VEL: 0.33 M/S
PV PEAK S VEL: 0.53 M/S
PV PEAK VELOCITY: 0.8 CM/S
RA MAJOR: 4.53 CM
RA PRESSURE: 3 MMHG
RA WIDTH: 3.75 CM
RIGHT VENTRICULAR END-DIASTOLIC DIMENSION: 2.92 CM
SINUS: 2.86 CM
STJ: 2.46 CM
TDI LATERAL: 0.06 M/S
TDI SEPTAL: 0.05 M/S
TDI: 0.06 M/S
TR MAX PG: 38 MMHG
TV REST PULMONARY ARTERY PRESSURE: 41 MMHG

## 2023-05-11 PROCEDURE — 93248 EXT ECG>7D<15D REV&INTERPJ: CPT | Mod: ,,, | Performed by: INTERNAL MEDICINE

## 2023-05-11 PROCEDURE — 93248 CV CARDIAC MONITOR - 3-15 DAY ADULT (CUPID ONLY): ICD-10-PCS | Mod: ,,, | Performed by: INTERNAL MEDICINE

## 2023-05-11 PROCEDURE — 93306 TTE W/DOPPLER COMPLETE: CPT

## 2023-05-11 PROCEDURE — 93306 ECHO (CUPID ONLY): ICD-10-PCS | Mod: 26,,, | Performed by: INTERNAL MEDICINE

## 2023-05-11 PROCEDURE — 93306 TTE W/DOPPLER COMPLETE: CPT | Mod: 26,,, | Performed by: INTERNAL MEDICINE

## 2023-05-12 ENCOUNTER — TELEPHONE (OUTPATIENT)
Dept: CARDIOLOGY | Facility: CLINIC | Age: 78
End: 2023-05-12
Payer: MEDICARE

## 2023-05-12 NOTE — TELEPHONE ENCOUNTER
Pt verbalized understanding with no questions or concerns       Please contact the patient and let them know that their echo reveal Overall normal heart function and valves with mild stiffness of heart (diastolic dysfunction). Continue low salt diet and good blood pressure control.

## 2023-05-18 ENCOUNTER — HOSPITAL ENCOUNTER (OUTPATIENT)
Dept: RADIOLOGY | Facility: HOSPITAL | Age: 78
Discharge: HOME | End: 2023-05-18
Attending: INTERNAL MEDICINE
Payer: MEDICARE

## 2023-05-18 ENCOUNTER — OFFICE VISIT (OUTPATIENT)
Dept: FAMILY MEDICINE | Facility: CLINIC | Age: 78
End: 2023-05-18
Payer: COMMERCIAL

## 2023-05-18 ENCOUNTER — DOCUMENT SCAN (OUTPATIENT)
Dept: HOME HEALTH SERVICES | Facility: HOSPITAL | Age: 78
End: 2023-05-18
Payer: MEDICARE

## 2023-05-18 VITALS
DIASTOLIC BLOOD PRESSURE: 60 MMHG | WEIGHT: 112.63 LBS | TEMPERATURE: 98 F | SYSTOLIC BLOOD PRESSURE: 112 MMHG | HEART RATE: 60 BPM | OXYGEN SATURATION: 98 % | BODY MASS INDEX: 19.96 KG/M2 | RESPIRATION RATE: 16 BRPM

## 2023-05-18 DIAGNOSIS — I10 PRIMARY HYPERTENSION: ICD-10-CM

## 2023-05-18 DIAGNOSIS — Z79.01 ANTICOAGULATED: ICD-10-CM

## 2023-05-18 DIAGNOSIS — R53.81 DEBILITY: ICD-10-CM

## 2023-05-18 DIAGNOSIS — E78.5 DYSLIPIDEMIA: ICD-10-CM

## 2023-05-18 DIAGNOSIS — I48.91 ATRIAL FIBRILLATION, UNSPECIFIED TYPE: Primary | ICD-10-CM

## 2023-05-18 DIAGNOSIS — I73.9 PAD (PERIPHERAL ARTERY DISEASE): ICD-10-CM

## 2023-05-18 DIAGNOSIS — Z87.01 HISTORY OF PNEUMONIA: ICD-10-CM

## 2023-05-18 DIAGNOSIS — R79.89 LFT ELEVATION: ICD-10-CM

## 2023-05-18 PROCEDURE — 99214 PR OFFICE/OUTPT VISIT, EST, LEVL IV, 30-39 MIN: ICD-10-PCS | Mod: S$GLB,,, | Performed by: INTERNAL MEDICINE

## 2023-05-18 PROCEDURE — 99214 OFFICE O/P EST MOD 30 MIN: CPT | Mod: S$GLB,,, | Performed by: INTERNAL MEDICINE

## 2023-05-18 PROCEDURE — 1159F MED LIST DOCD IN RCRD: CPT | Mod: CPTII,S$GLB,, | Performed by: INTERNAL MEDICINE

## 2023-05-18 PROCEDURE — 1126F AMNT PAIN NOTED NONE PRSNT: CPT | Mod: CPTII,S$GLB,, | Performed by: INTERNAL MEDICINE

## 2023-05-18 PROCEDURE — 71046 X-RAY EXAM CHEST 2 VIEWS: CPT | Mod: TC,FY,PO

## 2023-05-18 PROCEDURE — 3078F DIAST BP <80 MM HG: CPT | Mod: CPTII,S$GLB,, | Performed by: INTERNAL MEDICINE

## 2023-05-18 PROCEDURE — 3288F PR FALLS RISK ASSESSMENT DOCUMENTED: ICD-10-PCS | Mod: CPTII,S$GLB,, | Performed by: INTERNAL MEDICINE

## 2023-05-18 PROCEDURE — 1126F PR PAIN SEVERITY QUANTIFIED, NO PAIN PRESENT: ICD-10-PCS | Mod: CPTII,S$GLB,, | Performed by: INTERNAL MEDICINE

## 2023-05-18 PROCEDURE — 1101F PR PT FALLS ASSESS DOC 0-1 FALLS W/OUT INJ PAST YR: ICD-10-PCS | Mod: CPTII,S$GLB,, | Performed by: INTERNAL MEDICINE

## 2023-05-18 PROCEDURE — 1101F PT FALLS ASSESS-DOCD LE1/YR: CPT | Mod: CPTII,S$GLB,, | Performed by: INTERNAL MEDICINE

## 2023-05-18 PROCEDURE — 71046 X-RAY EXAM CHEST 2 VIEWS: CPT | Mod: 26,,, | Performed by: RADIOLOGY

## 2023-05-18 PROCEDURE — 3074F SYST BP LT 130 MM HG: CPT | Mod: CPTII,S$GLB,, | Performed by: INTERNAL MEDICINE

## 2023-05-18 PROCEDURE — 71046 XR CHEST PA AND LATERAL: ICD-10-PCS | Mod: 26,,, | Performed by: RADIOLOGY

## 2023-05-18 PROCEDURE — 3078F PR MOST RECENT DIASTOLIC BLOOD PRESSURE < 80 MM HG: ICD-10-PCS | Mod: CPTII,S$GLB,, | Performed by: INTERNAL MEDICINE

## 2023-05-18 PROCEDURE — 3074F PR MOST RECENT SYSTOLIC BLOOD PRESSURE < 130 MM HG: ICD-10-PCS | Mod: CPTII,S$GLB,, | Performed by: INTERNAL MEDICINE

## 2023-05-18 PROCEDURE — 99999 PR PBB SHADOW E&M-EST. PATIENT-LVL IV: CPT | Mod: PBBFAC,,, | Performed by: INTERNAL MEDICINE

## 2023-05-18 PROCEDURE — 3288F FALL RISK ASSESSMENT DOCD: CPT | Mod: CPTII,S$GLB,, | Performed by: INTERNAL MEDICINE

## 2023-05-18 PROCEDURE — 1159F PR MEDICATION LIST DOCUMENTED IN MEDICAL RECORD: ICD-10-PCS | Mod: CPTII,S$GLB,, | Performed by: INTERNAL MEDICINE

## 2023-05-18 PROCEDURE — 99999 PR PBB SHADOW E&M-EST. PATIENT-LVL IV: ICD-10-PCS | Mod: PBBFAC,,, | Performed by: INTERNAL MEDICINE

## 2023-05-18 NOTE — PROGRESS NOTES
Subjective:       Patient ID: Ezequiel Vegas is a 78 y.o. female.    Chief Complaint: Follow-up (1m), Hypertension, Hyperlipidemia, Peripheral Vascular Disease, Atrial Fibrillation, and Anticoagulation    Follow-up  Associated symptoms include arthralgias, fatigue and weakness. Pertinent negatives include no abdominal pain, chest pain, chills, congestion, coughing, diaphoresis, fever, headaches, joint swelling, myalgias, nausea, neck pain, numbness, rash, sore throat or vomiting.   Hypertension  Pertinent negatives include no chest pain, headaches, neck pain, palpitations or shortness of breath.   Hyperlipidemia  Pertinent negatives include no chest pain, myalgias or shortness of breath.   Atrial Fibrillation  Symptoms include weakness. Symptoms are negative for chest pain, dizziness, palpitations and shortness of breath. Past medical history includes atrial fibrillation and hyperlipidemia.   Past Medical History:   Diagnosis Date    Allergy     Cataract     Gout     PVD (peripheral vascular disease) 4/28/2023     Past Surgical History:   Procedure Laterality Date    CATARACT EXTRACTION Right 05/10/2018    HYSTERECTOMY      NEL/BSO    VARICOSE VEIN SURGERY       Family History   Problem Relation Age of Onset    Cervical cancer Mother     Glaucoma Mother     Esophageal cancer Mother     Hypertension Mother     No Known Problems Father     Prostate cancer Maternal Uncle     Lupus Other     Kidney disease Other     Stroke Neg Hx     Diabetes Neg Hx      Social History     Socioeconomic History    Marital status:     Number of children: 0   Tobacco Use    Smoking status: Never    Smokeless tobacco: Never    Tobacco comments:     occasionally   Substance and Sexual Activity    Alcohol use: Yes     Comment: occasional    Drug use: No    Sexual activity: Never     Review of Systems   Constitutional:  Positive for fatigue. Negative for activity change, appetite change, chills, diaphoresis, fever and unexpected  weight change.   HENT:  Negative for congestion, drooling, ear discharge, ear pain, facial swelling, hearing loss, mouth sores, nosebleeds, postnasal drip, rhinorrhea, sinus pressure, sneezing, sore throat, tinnitus, trouble swallowing and voice change.    Eyes:  Negative for photophobia, redness and visual disturbance.   Respiratory:  Negative for apnea, cough, choking, chest tightness, shortness of breath and wheezing.    Cardiovascular:  Negative for chest pain and palpitations.   Gastrointestinal:  Negative for abdominal distention, abdominal pain, blood in stool, constipation, diarrhea, nausea and vomiting.   Endocrine: Negative for cold intolerance, heat intolerance, polydipsia, polyphagia and polyuria.   Genitourinary:  Negative for decreased urine volume, difficulty urinating, dysuria, flank pain, frequency, genital sores, hematuria, pelvic pain, urgency and vaginal discharge.   Musculoskeletal:  Positive for arthralgias and gait problem. Negative for back pain, joint swelling, myalgias, neck pain and neck stiffness.   Skin:  Negative for color change, pallor, rash and wound.   Allergic/Immunologic: Negative for food allergies and immunocompromised state.   Neurological:  Positive for weakness. Negative for dizziness, tremors, seizures, syncope, speech difficulty, light-headedness, numbness and headaches.   Hematological:  Negative for adenopathy. Does not bruise/bleed easily.   Psychiatric/Behavioral:  Negative for agitation, behavioral problems, confusion, decreased concentration, dysphoric mood, hallucinations, self-injury, sleep disturbance and suicidal ideas. The patient is not nervous/anxious and is not hyperactive.    All other systems reviewed and are negative.    Objective:      Physical Exam  Vitals and nursing note reviewed.   Constitutional:       General: She is not in acute distress.     Appearance: Normal appearance. She is well-developed. She is not diaphoretic.   HENT:      Head:  Normocephalic and atraumatic.      Mouth/Throat:      Pharynx: No oropharyngeal exudate.   Eyes:      General: No scleral icterus.  Neck:      Thyroid: No thyromegaly.      Vascular: No carotid bruit or JVD.      Trachea: No tracheal deviation.   Cardiovascular:      Rate and Rhythm: Normal rate and regular rhythm.      Heart sounds: Normal heart sounds.   Pulmonary:      Effort: Pulmonary effort is normal. No respiratory distress.      Breath sounds: Normal breath sounds. No wheezing or rales.   Chest:      Chest wall: No tenderness.   Abdominal:      General: Bowel sounds are normal. There is no distension.      Palpations: Abdomen is soft.      Tenderness: There is no abdominal tenderness. There is no guarding or rebound.   Musculoskeletal:         General: No tenderness. Normal range of motion.      Cervical back: Normal range of motion and neck supple.   Lymphadenopathy:      Cervical: No cervical adenopathy.   Skin:     General: Skin is warm and dry.      Coloration: Skin is not pale.      Findings: No erythema or rash.   Neurological:      Mental Status: She is alert and oriented to person, place, and time.   Psychiatric:         Behavior: Behavior normal.         Thought Content: Thought content normal.         Judgment: Judgment normal.       CMP  Sodium   Date Value Ref Range Status   04/17/2023 137 136 - 145 mmol/L Final     Potassium   Date Value Ref Range Status   04/17/2023 4.5 3.5 - 5.1 mmol/L Final     Chloride   Date Value Ref Range Status   04/17/2023 104 95 - 110 mmol/L Final     CO2   Date Value Ref Range Status   04/17/2023 23 23 - 29 mmol/L Final     Glucose   Date Value Ref Range Status   04/17/2023 78 70 - 110 mg/dL Final     BUN   Date Value Ref Range Status   04/17/2023 15 8 - 23 mg/dL Final     Creatinine   Date Value Ref Range Status   04/17/2023 1.0 0.5 - 1.4 mg/dL Final     Calcium   Date Value Ref Range Status   04/17/2023 10.2 8.7 - 10.5 mg/dL Final     Total Protein   Date Value Ref  Range Status   04/17/2023 8.1 6.0 - 8.4 g/dL Final     Albumin   Date Value Ref Range Status   04/17/2023 3.0 (L) 3.5 - 5.2 g/dL Final     Total Bilirubin   Date Value Ref Range Status   04/17/2023 0.4 0.1 - 1.0 mg/dL Final     Comment:     For infants and newborns, interpretation of results should be based  on gestational age, weight and in agreement with clinical  observations.    Premature Infant recommended reference ranges:  Up to 24 hours.............<8.0 mg/dL  Up to 48 hours............<12.0 mg/dL  3-5 days..................<15.0 mg/dL  6-29 days.................<15.0 mg/dL       Alkaline Phosphatase   Date Value Ref Range Status   04/17/2023 110 55 - 135 U/L Final     AST   Date Value Ref Range Status   04/17/2023 107 (H) 10 - 40 U/L Final     Comment:     *Result may be interfered by visible hemolysis     ALT   Date Value Ref Range Status   04/17/2023 82 (H) 10 - 44 U/L Final     Anion Gap   Date Value Ref Range Status   04/17/2023 10 8 - 16 mmol/L Final     eGFR if    Date Value Ref Range Status   02/08/2021 56.0 (A) >60 mL/min/1.73 m^2 Final     eGFR if non    Date Value Ref Range Status   02/08/2021 48.5 (A) >60 mL/min/1.73 m^2 Final     Comment:     Calculation used to obtain the estimated glomerular filtration  rate (eGFR) is the CKD-EPI equation.        Lab Results   Component Value Date    WBC 9.36 04/17/2023    HGB 11.7 (L) 04/17/2023    HCT 39.0 04/17/2023    MCV 96 04/17/2023     04/17/2023     Lab Results   Component Value Date    CHOL 211 (H) 10/13/2022     Lab Results   Component Value Date    HDL 71 10/13/2022     Lab Results   Component Value Date    LDLCALC 125.2 10/13/2022     Lab Results   Component Value Date    TRIG 74 10/13/2022     Lab Results   Component Value Date    CHOLHDL 33.6 10/13/2022     Lab Results   Component Value Date    TSH 0.669 10/13/2022     Lab Results   Component Value Date    HGBA1C 5.2 12/11/2020     Assessment:       1.  Atrial fibrillation, unspecified type    2. PAD (peripheral artery disease)    3. Primary hypertension    4. Dyslipidemia    5. Debility    6. Anticoagulated    7. History of pneumonia    8. LFT elevation        Plan:   Atrial fibrillation, unspecified type--------------continue meds-----------------sees cards--    PAD (peripheral artery disease)----------pletal-------sees dr maradiaga---    Primary hypertension    Dyslipidemia    Debility    Anticoagulated    History of pneumonia----treated---------------  -     CBC Auto Differential; Future; Expected date: 05/18/2023  -     X-Ray Chest PA And Lateral; Future; Expected date: 05/18/2023    LFT elevation  -     Comprehensive Metabolic Panel; Future; Expected date: 05/18/2023      Stable---------------------continue meds---------as above--------------f/u 4 months----------------

## 2023-05-19 ENCOUNTER — TELEPHONE (OUTPATIENT)
Dept: FAMILY MEDICINE | Facility: CLINIC | Age: 78
End: 2023-05-19
Payer: MEDICARE

## 2023-05-19 DIAGNOSIS — D64.9 ANEMIA, UNSPECIFIED TYPE: Primary | ICD-10-CM

## 2023-05-22 ENCOUNTER — TELEPHONE (OUTPATIENT)
Dept: HEMATOLOGY/ONCOLOGY | Facility: CLINIC | Age: 78
End: 2023-05-22
Payer: MEDICARE

## 2023-05-22 DIAGNOSIS — D64.9 ANEMIA: ICD-10-CM

## 2023-05-22 DIAGNOSIS — E53.8 B12 DEFICIENCY: Primary | ICD-10-CM

## 2023-05-22 NOTE — TELEPHONE ENCOUNTER
"Spoke to patient's niece "Rach in reference to needing additional/updated labs completed prior to Hematology appt. Lab orders placed.  Appt scheduled per request  in [place].   "

## 2023-06-01 LAB
OHS CV HOLTER SINUS AVERAGE HR: 69
OHS CV HOLTER SINUS MAX HR: 85
OHS CV HOLTER SINUS MIN HR: 59

## 2023-06-02 ENCOUNTER — TELEPHONE (OUTPATIENT)
Dept: CARDIOLOGY | Facility: CLINIC | Age: 78
End: 2023-06-02
Payer: MEDICARE

## 2023-06-02 NOTE — TELEPHONE ENCOUNTER
LVM for pt in regards to     Please contact the patient and let them know that their heart monitor results reveal normal average heart rates, there are frequent short lasting runs of SVT - but not sustained, continue current medications and follow up as scheduled. Monitor BP and heart rates at home.

## 2023-06-02 NOTE — TELEPHONE ENCOUNTER
The patient has been notified of this information and all questions answered.        ----- Message from Amber Constantino sent at 6/2/2023  4:39 PM CDT -----  Contact: rach/usha  .Type:  Patient Returning Call    Who Called:Rach  Who Left Message for Patient:Nurse  Does the patient know what this is regarding?:Yes  Would the patient rather a call back or a response via [facility]? Call back  Best Call Back Number:[phone]  Additional Information: NA              Thanks  KT

## 2023-06-07 ENCOUNTER — LAB VISIT (OUTPATIENT)
Dept: LAB | Facility: HOSPITAL | Age: 78
End: 2023-06-07
Attending: INTERNAL MEDICINE
Payer: MEDICARE

## 2023-06-07 DIAGNOSIS — E53.8 B12 DEFICIENCY: ICD-10-CM

## 2023-06-07 DIAGNOSIS — D64.9 ANEMIA: ICD-10-CM

## 2023-06-07 LAB
BASOPHILS # BLD AUTO: 0.06 K/UL (ref 0–0.2)
BASOPHILS NFR BLD: 0.8 % (ref 0–1.9)
DIFFERENTIAL METHOD: ABNORMAL
EOSINOPHIL # BLD AUTO: 0.2 K/UL (ref 0–0.5)
EOSINOPHIL NFR BLD: 2.9 % (ref 0–8)
ERYTHROCYTE [DISTWIDTH] IN BLOOD BY AUTOMATED COUNT: 15.8 % (ref 11.5–14.5)
FERRITIN SERPL-MCNC: 420 NG/ML (ref 20–300)
FOLATE SERPL-MCNC: 17.3 NG/ML (ref 4–24)
HAPTOGLOB SERPL-MCNC: 59 MG/DL (ref 30–250)
HCT VFR BLD AUTO: 35.9 % (ref 37–48.5)
HGB BLD-MCNC: 11.4 G/DL (ref 12–16)
IMM GRANULOCYTES # BLD AUTO: 0.01 K/UL (ref 0–0.04)
IMM GRANULOCYTES NFR BLD AUTO: 0.1 % (ref 0–0.5)
IRON SERPL-MCNC: 89 UG/DL (ref 30–160)
LDH SERPL L TO P-CCNC: 154 U/L (ref 110–260)
LYMPHOCYTES # BLD AUTO: 2.4 K/UL (ref 1–4.8)
LYMPHOCYTES NFR BLD: 32.5 % (ref 18–48)
MCH RBC QN AUTO: 29.5 PG (ref 27–31)
MCHC RBC AUTO-ENTMCNC: 31.8 G/DL (ref 32–36)
MCV RBC AUTO: 93 FL (ref 82–98)
MONOCYTES # BLD AUTO: 0.5 K/UL (ref 0.3–1)
MONOCYTES NFR BLD: 6.6 % (ref 4–15)
NEUTROPHILS # BLD AUTO: 4.3 K/UL (ref 1.8–7.7)
NEUTROPHILS NFR BLD: 57.1 % (ref 38–73)
NRBC BLD-RTO: 0 /100 WBC
PATH REV BLD -IMP: NORMAL
PATH REV BLD -IMP: NORMAL
PLATELET # BLD AUTO: 270 K/UL (ref 150–450)
PMV BLD AUTO: 10.6 FL (ref 9.2–12.9)
RBC # BLD AUTO: 3.87 M/UL (ref 4–5.4)
RETICS/RBC NFR AUTO: 1.9 % (ref 0.5–2.5)
SATURATED IRON: 37 % (ref 20–50)
TOTAL IRON BINDING CAPACITY: 240 UG/DL (ref 250–450)
TRANSFERRIN SERPL-MCNC: 162 MG/DL (ref 200–375)
VIT B12 SERPL-MCNC: >2000 PG/ML (ref 210–950)
WBC # BLD AUTO: 7.48 K/UL (ref 3.9–12.7)

## 2023-06-07 PROCEDURE — 85060 PATHOLOGIST REVIEW: ICD-10-PCS | Mod: ,,, | Performed by: PATHOLOGY

## 2023-06-07 PROCEDURE — 83615 LACTATE (LD) (LDH) ENZYME: CPT | Performed by: INTERNAL MEDICINE

## 2023-06-07 PROCEDURE — 36415 COLL VENOUS BLD VENIPUNCTURE: CPT | Mod: PN | Performed by: INTERNAL MEDICINE

## 2023-06-07 PROCEDURE — 83010 ASSAY OF HAPTOGLOBIN QUANT: CPT | Performed by: INTERNAL MEDICINE

## 2023-06-07 PROCEDURE — 82746 ASSAY OF FOLIC ACID SERUM: CPT | Performed by: INTERNAL MEDICINE

## 2023-06-07 PROCEDURE — 84466 ASSAY OF TRANSFERRIN: CPT | Performed by: INTERNAL MEDICINE

## 2023-06-07 PROCEDURE — 82525 ASSAY OF COPPER: CPT | Performed by: INTERNAL MEDICINE

## 2023-06-07 PROCEDURE — 85060 BLOOD SMEAR INTERPRETATION: CPT | Mod: ,,, | Performed by: PATHOLOGY

## 2023-06-07 PROCEDURE — 82607 VITAMIN B-12: CPT | Performed by: INTERNAL MEDICINE

## 2023-06-07 PROCEDURE — 82728 ASSAY OF FERRITIN: CPT | Performed by: INTERNAL MEDICINE

## 2023-06-07 PROCEDURE — 85025 COMPLETE CBC W/AUTO DIFF WBC: CPT | Performed by: INTERNAL MEDICINE

## 2023-06-07 PROCEDURE — 85045 AUTOMATED RETICULOCYTE COUNT: CPT | Performed by: INTERNAL MEDICINE

## 2023-06-12 LAB — COPPER SERPL-MCNC: 1411 UG/L (ref 810–1990)

## 2023-07-12 NOTE — PROGRESS NOTES
HEMATOLOGY / ONCOLOGY   CLINIC NOTE     ONCOLOGICAL HISTORY:     Diagnosis:  - Anemia     Treatment History:  -     Current Treatment:   -     Subjective:       Chief Complaint: Anemia      HPI    Ezequiel LIMA Ascencion  78 y.o.  female with past medical history significant for HTN, HLD, PVD, AFIB on full anticoagulation is referred for evaluation of anemia    Interval History:     Denies any previous history of anemia.  Denies fatigue.  Denies any blood in the urine or bowel movements.  Denies any black color bowel movements.  Denies any vaginal bleeding.  Denies any fevers, night sweats or weight loss    Past Medical History:   Diagnosis Date    Allergy     Cataract     Gout     PVD (peripheral vascular disease) 4/28/2023      Past Surgical History:   Procedure Laterality Date    CATARACT EXTRACTION Right 05/10/2018    HYSTERECTOMY      NEL/BSO    VARICOSE VEIN SURGERY       Social History     Socioeconomic History    Marital status:     Number of children: 0   Tobacco Use    Smoking status: Never    Smokeless tobacco: Never    Tobacco comments:     occasionally   Substance and Sexual Activity    Alcohol use: Yes     Comment: occasional    Drug use: No    Sexual activity: Never      Family History   Problem Relation Age of Onset    Cervical cancer Mother     Glaucoma Mother     Esophageal cancer Mother     Hypertension Mother     No Known Problems Father     Prostate cancer Maternal Uncle     Lupus Other     Kidney disease Other     Stroke Neg Hx     Diabetes Neg Hx       Review of patient's allergies indicates:   Allergen Reactions    Penicillins      Other reaction(s): Itching      Review of Systems   Constitutional: Negative.  Negative for activity change, chills, fatigue and fever.   HENT: Negative.     Eyes: Negative.    Respiratory:  Negative for cough and shortness of breath.    Cardiovascular:  Negative for chest pain and leg swelling.   Gastrointestinal:  Negative for constipation, diarrhea,  nausea and vomiting.   Endocrine: Negative.    Genitourinary: Negative.    Musculoskeletal:  Negative for arthralgias and myalgias.   Integumentary:  Negative.   Allergic/Immunologic: Negative.    Neurological:  Negative for light-headedness, numbness and headaches.   Hematological: Negative.    Psychiatric/Behavioral: Negative.         Objective:        Vitals:    07/13/23 1346   BP: 124/73   Pulse: 60   Temp: 97.8 °F (36.6 °C)        Physical Exam  Constitutional:       General: She is not in acute distress.     Appearance: She is well-developed. She is not ill-appearing.   HENT:      Head: Normocephalic and atraumatic.      Mouth/Throat:      Mouth: Mucous membranes are moist.   Eyes:      Extraocular Movements: Extraocular movements intact.      Conjunctiva/sclera: Conjunctivae normal.   Cardiovascular:      Rate and Rhythm: Normal rate and regular rhythm.      Heart sounds: Normal heart sounds.   Pulmonary:      Effort: Pulmonary effort is normal. No respiratory distress.      Breath sounds: Normal breath sounds. No wheezing.   Abdominal:      Palpations: Abdomen is soft.      Tenderness: There is no abdominal tenderness.   Musculoskeletal:         General: Normal range of motion.      Cervical back: Normal range of motion and neck supple.   Skin:     General: Skin is warm.   Neurological:      Mental Status: She is alert and oriented to person, place, and time. Mental status is at baseline.      Cranial Nerves: No cranial nerve deficit.   Psychiatric:         Mood and Affect: Mood normal.         LABS / IMAGING      - Reviewed       Assessment:         NORMOCYTIC ANEMIA  - noted mild normocytic anemia since April 2023 with hemoglobin 11.4, MCV 93  - initial workup with saturated iron 37, ferritin 420, folate 17.3 and elevated B12  - 06/07/23 smear: No diagnostic morphologic abnormalities of red cells, white cells, or platelets is seen on scanning.  Correlate clinically.           Plan:       - Basic workup  done to evaluate for etiologies of anemia.  Discussed further testing including bone marrow biopsy but patient would like to hold onto it for now.  Patient with mild normocytic anemia, continue to closely monitor and if worsening then would do bone marrow biopsy  - MD / LABS VISIT - 16 WEEKS - follow up CBC, CMP, retic count, epo         Med Onc Chart Routing      Follow up with physician    Follow up with H[facility]INIO 4 months.   Infusion scheduling note    Injection scheduling note    Labs CBC and CMP   Scheduling:  Preferred lab: EPO, RETIC  Lab interval:  labs before next visit   Imaging    Pharmacy appointment    Other referrals              The patient was seen, interviewed and examined. Pertinent lab and radiology studies were reviewed. Pt instructed to call should develop concerning signs/symptoms or have further questions.       Portions of the record may have been created with voice recognition software. Occasional wrong-word or sound-a-like substitutions may have occurred due to the inherent limitations of voice recognition software. Read the chart carefully and recognize, using context, where substitutions have occurred.    Meghan Nicole MD  Hematology / Oncology

## 2023-07-13 ENCOUNTER — OFFICE VISIT (OUTPATIENT)
Dept: HEMATOLOGY/ONCOLOGY | Facility: CLINIC | Age: 78
End: 2023-07-13
Payer: MEDICARE

## 2023-07-13 VITALS
HEIGHT: 65 IN | WEIGHT: 116.88 LBS | HEART RATE: 60 BPM | DIASTOLIC BLOOD PRESSURE: 73 MMHG | BODY MASS INDEX: 19.47 KG/M2 | SYSTOLIC BLOOD PRESSURE: 124 MMHG | TEMPERATURE: 98 F

## 2023-07-13 DIAGNOSIS — D64.9 ANEMIA, UNSPECIFIED TYPE: ICD-10-CM

## 2023-07-13 DIAGNOSIS — D64.9 NORMOCYTIC ANEMIA: ICD-10-CM

## 2023-07-13 PROCEDURE — 3288F PR FALLS RISK ASSESSMENT DOCUMENTED: ICD-10-PCS | Mod: CPTII,S$GLB,, | Performed by: INTERNAL MEDICINE

## 2023-07-13 PROCEDURE — 99999 PR PBB SHADOW E&M-EST. PATIENT-LVL III: ICD-10-PCS | Mod: PBBFAC,,, | Performed by: INTERNAL MEDICINE

## 2023-07-13 PROCEDURE — 1126F PR PAIN SEVERITY QUANTIFIED, NO PAIN PRESENT: ICD-10-PCS | Mod: CPTII,S$GLB,, | Performed by: INTERNAL MEDICINE

## 2023-07-13 PROCEDURE — 3074F SYST BP LT 130 MM HG: CPT | Mod: CPTII,S$GLB,, | Performed by: INTERNAL MEDICINE

## 2023-07-13 PROCEDURE — 3074F PR MOST RECENT SYSTOLIC BLOOD PRESSURE < 130 MM HG: ICD-10-PCS | Mod: CPTII,S$GLB,, | Performed by: INTERNAL MEDICINE

## 2023-07-13 PROCEDURE — 99999 PR PBB SHADOW E&M-EST. PATIENT-LVL III: CPT | Mod: PBBFAC,,, | Performed by: INTERNAL MEDICINE

## 2023-07-13 PROCEDURE — 1126F AMNT PAIN NOTED NONE PRSNT: CPT | Mod: CPTII,S$GLB,, | Performed by: INTERNAL MEDICINE

## 2023-07-13 PROCEDURE — 1101F PR PT FALLS ASSESS DOC 0-1 FALLS W/OUT INJ PAST YR: ICD-10-PCS | Mod: CPTII,S$GLB,, | Performed by: INTERNAL MEDICINE

## 2023-07-13 PROCEDURE — 3078F PR MOST RECENT DIASTOLIC BLOOD PRESSURE < 80 MM HG: ICD-10-PCS | Mod: CPTII,S$GLB,, | Performed by: INTERNAL MEDICINE

## 2023-07-13 PROCEDURE — 3078F DIAST BP <80 MM HG: CPT | Mod: CPTII,S$GLB,, | Performed by: INTERNAL MEDICINE

## 2023-07-13 PROCEDURE — 99203 OFFICE O/P NEW LOW 30 MIN: CPT | Mod: S$GLB,,, | Performed by: INTERNAL MEDICINE

## 2023-07-13 PROCEDURE — 99203 PR OFFICE/OUTPT VISIT, NEW, LEVL III, 30-44 MIN: ICD-10-PCS | Mod: S$GLB,,, | Performed by: INTERNAL MEDICINE

## 2023-07-13 PROCEDURE — 1101F PT FALLS ASSESS-DOCD LE1/YR: CPT | Mod: CPTII,S$GLB,, | Performed by: INTERNAL MEDICINE

## 2023-07-13 PROCEDURE — 3288F FALL RISK ASSESSMENT DOCD: CPT | Mod: CPTII,S$GLB,, | Performed by: INTERNAL MEDICINE

## 2023-07-28 ENCOUNTER — OFFICE VISIT (OUTPATIENT)
Dept: CARDIOLOGY | Facility: CLINIC | Age: 78
End: 2023-07-28
Payer: MEDICARE

## 2023-07-28 VITALS
SYSTOLIC BLOOD PRESSURE: 122 MMHG | HEIGHT: 65 IN | OXYGEN SATURATION: 100 % | WEIGHT: 117.75 LBS | BODY MASS INDEX: 19.62 KG/M2 | HEART RATE: 58 BPM | DIASTOLIC BLOOD PRESSURE: 72 MMHG

## 2023-07-28 DIAGNOSIS — Z72.0 TOBACCO USE: ICD-10-CM

## 2023-07-28 DIAGNOSIS — D64.9 NORMOCYTIC ANEMIA: ICD-10-CM

## 2023-07-28 DIAGNOSIS — N18.31 STAGE 3A CHRONIC KIDNEY DISEASE: ICD-10-CM

## 2023-07-28 DIAGNOSIS — I10 PRIMARY HYPERTENSION: ICD-10-CM

## 2023-07-28 DIAGNOSIS — R41.3 MEMORY CHANGES: ICD-10-CM

## 2023-07-28 DIAGNOSIS — I48.91 ATRIAL FIBRILLATION, UNSPECIFIED TYPE: ICD-10-CM

## 2023-07-28 DIAGNOSIS — E78.5 DYSLIPIDEMIA: ICD-10-CM

## 2023-07-28 DIAGNOSIS — I73.9 PAD (PERIPHERAL ARTERY DISEASE): Primary | ICD-10-CM

## 2023-07-28 DIAGNOSIS — R53.81 DEBILITY: ICD-10-CM

## 2023-07-28 PROCEDURE — 1159F PR MEDICATION LIST DOCUMENTED IN MEDICAL RECORD: ICD-10-PCS | Mod: CPTII,S$GLB,, | Performed by: INTERNAL MEDICINE

## 2023-07-28 PROCEDURE — 3078F PR MOST RECENT DIASTOLIC BLOOD PRESSURE < 80 MM HG: ICD-10-PCS | Mod: CPTII,S$GLB,, | Performed by: INTERNAL MEDICINE

## 2023-07-28 PROCEDURE — 1125F AMNT PAIN NOTED PAIN PRSNT: CPT | Mod: CPTII,S$GLB,, | Performed by: INTERNAL MEDICINE

## 2023-07-28 PROCEDURE — 99213 OFFICE O/P EST LOW 20 MIN: CPT | Mod: S$GLB,,, | Performed by: INTERNAL MEDICINE

## 2023-07-28 PROCEDURE — 3078F DIAST BP <80 MM HG: CPT | Mod: CPTII,S$GLB,, | Performed by: INTERNAL MEDICINE

## 2023-07-28 PROCEDURE — 3074F PR MOST RECENT SYSTOLIC BLOOD PRESSURE < 130 MM HG: ICD-10-PCS | Mod: CPTII,S$GLB,, | Performed by: INTERNAL MEDICINE

## 2023-07-28 PROCEDURE — 3074F SYST BP LT 130 MM HG: CPT | Mod: CPTII,S$GLB,, | Performed by: INTERNAL MEDICINE

## 2023-07-28 PROCEDURE — 99999 PR PBB SHADOW E&M-EST. PATIENT-LVL IV: ICD-10-PCS | Mod: PBBFAC,,, | Performed by: INTERNAL MEDICINE

## 2023-07-28 PROCEDURE — 1125F PR PAIN SEVERITY QUANTIFIED, PAIN PRESENT: ICD-10-PCS | Mod: CPTII,S$GLB,, | Performed by: INTERNAL MEDICINE

## 2023-07-28 PROCEDURE — 99999 PR PBB SHADOW E&M-EST. PATIENT-LVL IV: CPT | Mod: PBBFAC,,, | Performed by: INTERNAL MEDICINE

## 2023-07-28 PROCEDURE — 1159F MED LIST DOCD IN RCRD: CPT | Mod: CPTII,S$GLB,, | Performed by: INTERNAL MEDICINE

## 2023-07-28 PROCEDURE — 99213 PR OFFICE/OUTPT VISIT, EST, LEVL III, 20-29 MIN: ICD-10-PCS | Mod: S$GLB,,, | Performed by: INTERNAL MEDICINE

## 2023-07-28 RX ORDER — PRAVASTATIN SODIUM 40 MG/1
40 TABLET ORAL DAILY
Qty: 90 TABLET | Refills: 3 | Status: SHIPPED | OUTPATIENT
Start: 2023-07-28 | End: 2024-07-27

## 2023-07-28 NOTE — PROGRESS NOTES
Subjective:   Patient ID:  Ezequiel Vegas is a 78 y.o. female who presents for follow up of No chief complaint on file.      HPI  DR DIETZ 4/21/2023  The patient came in today for cardiac consult of No chief complaint on file.     Ezequiel Vegas is a 78 y.o. female pt with newly diagnosed AFib, HTN, OA, CKD3, HLD, tobacco use presents for CV eval of Afib     Reason for Hospitalization   78-year-old female with a PMH of HTN and arthritis who presented to the ED earlier this evening after her family noticed she had not been eating or drinking for ~1 week and had some foul-smelling urine. On my exam, there is no family at bedside and the patient is a rather poor historian so history has mainly come from chart review. After noticing the symptoms, the family called EMS. On EMS arrival, her heart rate was in the 180s-200s improved to the 140s with 1 L IVF in route to the ED. On further probing, the patient does admit to cough that has been progressive over the last few days. It is productive of a gray/yellow sputum and associated with some chills. She denies other associated symptoms. No exacerbating or alleviating factors.    Multiple significant lab abnormalities including: Sodium 148, creatinine 1.99 troponin 0.07, lactic acid 8.6 --> 3.1, WBC 20.6. CT head unremarkable. CXR with pneumonia in the left lower lung zone and a small left-sided pleural effusion.    4/1 feeling well; tolerating diet. Worked with PT. No chest pain sob.  helping her with placement.   4/2 feeling ok. Reports toe pain (no ulcer, no hx injury). No chest pain sob.   4/3 reports toe pain still significant. Xr shows hallus valgus. Orthopedics consulted and recommended conservative management, post op boot. No chest pain sob.   4/4 feeling ok. No chest pain sob.   4/5 no chest pain ob. Feeling well. Working with PT. Motivated to continue working in bed when PT is not around.   4/6 no issue. Now family wants to go home instead of  residential placement.   4/7 received most equipment. Family reports they can manage at home until wheelchair arrives next day. No chest pain sob. Feeling well. Exercising in bed.        4/21/23  Pt was admitted for sepsis sec to [facility] went into Afib sec to sepsis/KENA, she had cardiology consulted and started on Eliqius and BB.      She has been living with niece now, is gaining some weight. She weighed 99 lbs at hospital.   BP today low 104/62, HR 58. BMI 18 - 106 lbs.      Patient feels  no leg swelling, no PND, no dizziness, no syncope, no CNS symptoms.     Patient is compliant with medications.     March 2023   CONCLUSIONS:   1. The left ventricle is small. Normal left ventricular systolic function. LVEF 55 - 65%.   Normal diastolic function.   2. Normal right ventricular size. Severe right ventricular hypokinesis.   3. Mild mitral valve regurgitation. Moderate mitral annular calcification.   4. Mild to moderate aortic valve regurgitation.   5. Mild to moderate tricuspid valve regurgitation.       4/28/2023   REFERRED FROM DR BUSCH for leg pain   She has been complaining of episodic pain in feet knees and sometimes in groin. The legs are cold she has a pinch in groin legs and feet at nite. Has no back pain she quits smoking 1 month.she can walk w/o any calf claudication the pain is intermittent. Has no discoloration in feet or numbness . Has toe pain and ankle  she had toe nail trimmed helped the pain. She had arterial study done showed bilateral severe sfa disease with monophasic flow distally.       7/28/2023  Has gained weight not smoking not walking much or ambuilating. Denies claudication no discoloration or ulceration in feet. Tolerated pletal well.    Past Medical History:   Diagnosis Date    Allergy     Cataract     Gout     PVD (peripheral vascular disease) 4/28/2023       Past Surgical History:   Procedure Laterality Date    CATARACT EXTRACTION Right 05/10/2018    HYSTERECTOMY      NEL/BSO    VARICOSE  VEIN SURGERY         Social History     Tobacco Use    Smoking status: Never    Smokeless tobacco: Never    Tobacco comments:     occasionally   Substance Use Topics    Alcohol use: Yes     Comment: occasional    Drug use: No       Family History   Problem Relation Age of Onset    Cervical cancer Mother     Glaucoma Mother     Esophageal cancer Mother     Hypertension Mother     No Known Problems Father     Prostate cancer Maternal Uncle     Lupus Other     Kidney disease Other     Stroke Neg Hx     Diabetes Neg Hx        Current Outpatient Medications   Medication Sig    amLODIPine (NORVASC) 5 MG tablet Take 1 tablet (5 mg total) by mouth once daily.    benzonatate (TESSALON) 200 MG capsule Take 200 mg by mouth 3 (three) times daily as needed for Cough.    cilostazoL (PLETAL) 50 MG Tab Take 1 tablet (50 mg total) by mouth 2 (two) times daily.    diclofenac sodium (VOLTAREN) 1 % Gel Apply 2 g topically 2 (two) times daily.    ELIQUIS 2.5 mg Tab Take 1 tablet (2.5 mg total) by mouth 2 (two) times daily.    HYDROcodone-acetaminophen (NORCO) 5-325 mg per tablet Take 1 tablet by mouth 4 (four) times daily as needed.    metoprolol succinate (TOPROL-XL) 50 MG 24 hr tablet Take 1 tablet (50 mg total) by mouth once daily.     No current facility-administered medications for this visit.     Current Outpatient Medications on File Prior to Visit   Medication Sig    amLODIPine (NORVASC) 5 MG tablet Take 1 tablet (5 mg total) by mouth once daily.    benzonatate (TESSALON) 200 MG capsule Take 200 mg by mouth 3 (three) times daily as needed for Cough.    cilostazoL (PLETAL) 50 MG Tab Take 1 tablet (50 mg total) by mouth 2 (two) times daily.    diclofenac sodium (VOLTAREN) 1 % Gel Apply 2 g topically 2 (two) times daily.    ELIQUIS 2.5 mg Tab Take 1 tablet (2.5 mg total) by mouth 2 (two) times daily.    HYDROcodone-acetaminophen (NORCO) 5-325 mg per tablet Take 1 tablet by mouth 4 (four) times daily as needed.    metoprolol  "succinate (TOPROL-XL) 50 MG 24 hr tablet Take 1 tablet (50 mg total) by mouth once daily.     No current facility-administered medications on file prior to visit.     Review of patient's allergies indicates:   Allergen Reactions    Penicillins      Other reaction(s): Itching      Review of Systems   Constitutional: Negative for diaphoresis, malaise/fatigue and weight gain.   HENT:  Negative for hoarse voice.    Eyes:  Negative for double vision and visual disturbance.   Cardiovascular:  Negative for chest pain, claudication, cyanosis, dyspnea on exertion, irregular heartbeat, leg swelling, near-syncope, orthopnea, palpitations, paroxysmal nocturnal dyspnea and syncope.   Respiratory:  Negative for cough, hemoptysis, shortness of breath and snoring.    Hematologic/Lymphatic: Negative for bleeding problem. Does not bruise/bleed easily.   Skin:  Negative for color change and poor wound healing.   Musculoskeletal:  Negative for muscle cramps, muscle weakness and myalgias.   Gastrointestinal:  Negative for bloating, abdominal pain, change in bowel habit, diarrhea, heartburn, hematemesis, hematochezia, melena and nausea.   Neurological:  Negative for excessive daytime sleepiness, dizziness, headaches, light-headedness, loss of balance, numbness and weakness.   Psychiatric/Behavioral:  Negative for memory loss. The patient does not have insomnia.    Allergic/Immunologic: Negative for hives.     Objective:   Physical Exam  Vitals:    07/28/23 0842 07/28/23 0846   BP: 124/68 122/72   BP Location: Right arm Left arm   Patient Position: Sitting Sitting   BP Method: Medium (Manual) Medium (Manual)   Pulse: (!) 58    SpO2: 100%    Weight: 53.4 kg (117 lb 11.6 oz)    Height: 5' 5" (1.651 m)    Constitutional:       General: She is not in acute distress.     Appearance: Normal appearance. She is well-developed. She is not ill-appearing.   HENT:      Head: Normocephalic and atraumatic.   Eyes:      General: No scleral icterus.     " Pupils: Pupils are equal, round, and reactive to light.   Neck:      Thyroid: No thyromegaly.      Vascular: Normal carotid pulses. No carotid bruit, hepatojugular reflux or JVD.      Trachea: No tracheal deviation.   Cardiovascular:      Rate and Rhythm: Normal rate and regular rhythm.      Pulses:           Carotid pulses are 2+ on the right side and 2+ on the left side.       Radial pulses are 2+ on the right side and 2+ on the left side.        Femoral pulses are 2+ on the right side and 2+ on the left side.       Popliteal pulses are 0 on the right side and 0 on the left side.        Dorsalis pedis pulses are 0 on the right side and 0 on the left side.        Posterior tibial pulses are 0 on the right side and 0 on the left side.      Heart sounds: Normal heart sounds. No murmur heard.    No friction rub. No gallop.   Pulmonary:      Effort: Pulmonary effort is normal. No respiratory distress.      Breath sounds: Normal breath sounds. No wheezing, rhonchi or rales.   Chest:      Chest wall: No tenderness.   Abdominal:      General: Bowel sounds are normal. There is no abdominal bruit.      Palpations: Abdomen is soft. There is no hepatomegaly or pulsatile mass.      Tenderness: There is no abdominal tenderness.   Musculoskeletal:      Right shoulder: No deformity.      Cervical back: Normal range of motion and neck supple.      Comments: Mild left foot swelling  scar well healed.    Skin:     General: Skin is warm and dry.      Findings: No erythema or rash.      Nails: There is no clubbing.   Neurological:      Mental Status: She is alert and oriented to person, place, and time.      Cranial Nerves: No cranial nerve deficit.      Coordination: Coordination normal.   Psychiatric:         Speech: Speech normal.         Behavior: Behavior normal.      Lab Results   Component Value Date    CHOL 211 (H) 10/13/2022    CHOL 225 (H) 12/11/2020    CHOL 176 07/01/2019      Body mass index is 19.59 kg/m².   Lab Results    Component Value Date    HGBA1C 5.2 12/11/2020      BMP  Lab Results   Component Value Date     05/18/2023    K 4.8 05/18/2023     05/18/2023    CO2 22 (L) 05/18/2023    BUN 16 05/18/2023    CREATININE 1.1 05/18/2023    CALCIUM 9.7 05/18/2023    ANIONGAP 10 05/18/2023    EGFRNORACEVR 50.8 (A) 05/18/2023      Lab Results   Component Value Date    HDL 71 10/13/2022    HDL 76 (H) 12/11/2020    HDL 70 07/01/2019     Lab Results   Component Value Date    LDLCALC 125.2 10/13/2022    LDLCALC 131.0 12/11/2020    LDLCALC 89.2 07/01/2019     Lab Results   Component Value Date    TRIG 74 10/13/2022    TRIG 90 12/11/2020    TRIG 84 07/01/2019     Lab Results   Component Value Date    CHOLHDL 33.6 10/13/2022    CHOLHDL 33.8 12/11/2020    CHOLHDL 39.8 07/01/2019       Chemistry        Component Value Date/Time     05/18/2023 1137    K 4.8 05/18/2023 1137     05/18/2023 1137    CO2 22 (L) 05/18/2023 1137    BUN 16 05/18/2023 1137    CREATININE 1.1 05/18/2023 1137    GLU 65 (L) 05/18/2023 1137        Component Value Date/Time    CALCIUM 9.7 05/18/2023 1137    ALKPHOS 69 05/18/2023 1137    AST 25 05/18/2023 1137    ALT 18 05/18/2023 1137    BILITOT 0.4 05/18/2023 1137    ESTGFRAFRICA 82 04/04/2023 0505    ESTGFRAFRICA 56.0 (A) 02/08/2021 1500    EGFRNONAA 48.5 (A) 02/08/2021 1500          Lab Results   Component Value Date    TSH 0.669 10/13/2022     No results found for: INR, PROTIME  Lab Results   Component Value Date    WBC 7.48 06/07/2023    HGB 11.4 (L) 06/07/2023    HCT 35.9 (L) 06/07/2023    MCV 93 06/07/2023     06/07/2023     BMP  Sodium   Date Value Ref Range Status   05/18/2023 141 136 - 145 mmol/L Final     Potassium   Date Value Ref Range Status   05/18/2023 4.8 3.5 - 5.1 mmol/L Final     Chloride   Date Value Ref Range Status   05/18/2023 109 95 - 110 mmol/L Final     CO2   Date Value Ref Range Status   05/18/2023 22 (L) 23 - 29 mmol/L Final     BUN   Date Value Ref Range Status    05/18/2023 16 8 - 23 mg/dL Final     Creatinine   Date Value Ref Range Status   05/18/2023 1.1 0.5 - 1.4 mg/dL Final     Calcium   Date Value Ref Range Status   05/18/2023 9.7 8.7 - 10.5 mg/dL Final     Anion Gap   Date Value Ref Range Status   05/18/2023 10 8 - 16 mmol/L Final     eGFR if    Date Value Ref Range Status   02/08/2021 56.0 (A) >60 mL/min/1.73 m^2 Final     eGFR    Date Value Ref Range Status   04/04/2023 82 mL/min/1.73mSq Final     Comment:     In accordance with NKF-ASN Task Force recommendation, calculation based on the Chronic Kidney Disease Epidemiology Collaboration (CKD-EPI) equation without adjustment for race. eGFR adjusted for gender and age and calculated in ml/min/1.73mSquared. eGFR cannot be calculated if patient is under 18 years of age.     Reference Range:   >= 60 ml/min/1.73mSquared.     eGFR if non    Date Value Ref Range Status   02/08/2021 48.5 (A) >60 mL/min/1.73 m^2 Final     Comment:     Calculation used to obtain the estimated glomerular filtration  rate (eGFR) is the CKD-EPI equation.        CrCl cannot be calculated (Patient's most recent lab result is older than the maximum 7 days allowed.).  Findings    TAMMY The right ankle [DT] artery has monophasic flow.   The right ankle [DP] artery has monophasic flow.   The left ankle [PT] artery has triphasic flow.  The left ankle [DP] artery has triphasic flow.         Medication Changes      None     Medication List     US Measurements - TAMMY    Right   Right arm  mmHg         Right posterior tibial 74 mmHg         Right dorsalis pedis 86 mmHg         Right TAMMY 0.54          Right toe pressure 16 mmHg         Right TBI 0.1           Left   Left arm  mmHg         Left posterior tibial 144 mmHg         Left dorsalis pedis 135 mmHg         Left TAMMY 0.91          Left toe pressure 98 mmHg         Left TBI 0.62            Assessment:     1. PAD (peripheral artery disease)    2.  Memory changes    3. Atrial fibrillation, unspecified type    4. Primary hypertension    5. Dyslipidemia    6. Stage 3a chronic kidney disease    7. Tobacco use    8. Debility    9. Normocytic anemia      Pvd wise stable tolerated pletal well has good capillary refill emncouraged walking exercise she ahs stopped smoking taking eliquis for afib asymptomatic tolerated well.   Hlp needs to be on statins will STart pravastatin 40 mg and she can follow with DR DIETZ.   Plan:   Continue current therapy  Cardiac low salt diet.  Risk factor modification and excercise program.  F/u in 6 months

## 2023-08-17 ENCOUNTER — OFFICE VISIT (OUTPATIENT)
Dept: FAMILY MEDICINE | Facility: CLINIC | Age: 78
End: 2023-08-17
Payer: MEDICARE

## 2023-08-17 VITALS
OXYGEN SATURATION: 97 % | DIASTOLIC BLOOD PRESSURE: 70 MMHG | RESPIRATION RATE: 16 BRPM | SYSTOLIC BLOOD PRESSURE: 118 MMHG | BODY MASS INDEX: 19.44 KG/M2 | TEMPERATURE: 98 F | HEART RATE: 60 BPM | WEIGHT: 116.88 LBS

## 2023-08-17 DIAGNOSIS — H61.23 EXCESSIVE CERUMEN IN BOTH EAR CANALS: ICD-10-CM

## 2023-08-17 DIAGNOSIS — H91.90 HEARING LOSS, UNSPECIFIED HEARING LOSS TYPE, UNSPECIFIED LATERALITY: ICD-10-CM

## 2023-08-17 PROCEDURE — 99213 OFFICE O/P EST LOW 20 MIN: CPT | Mod: S$GLB,,, | Performed by: INTERNAL MEDICINE

## 2023-08-17 PROCEDURE — 3288F FALL RISK ASSESSMENT DOCD: CPT | Mod: CPTII,S$GLB,, | Performed by: INTERNAL MEDICINE

## 2023-08-17 PROCEDURE — 1101F PR PT FALLS ASSESS DOC 0-1 FALLS W/OUT INJ PAST YR: ICD-10-PCS | Mod: CPTII,S$GLB,, | Performed by: INTERNAL MEDICINE

## 2023-08-17 PROCEDURE — 1126F PR PAIN SEVERITY QUANTIFIED, NO PAIN PRESENT: ICD-10-PCS | Mod: CPTII,S$GLB,, | Performed by: INTERNAL MEDICINE

## 2023-08-17 PROCEDURE — 3078F DIAST BP <80 MM HG: CPT | Mod: CPTII,S$GLB,, | Performed by: INTERNAL MEDICINE

## 2023-08-17 PROCEDURE — 1159F PR MEDICATION LIST DOCUMENTED IN MEDICAL RECORD: ICD-10-PCS | Mod: CPTII,S$GLB,, | Performed by: INTERNAL MEDICINE

## 2023-08-17 PROCEDURE — 3288F PR FALLS RISK ASSESSMENT DOCUMENTED: ICD-10-PCS | Mod: CPTII,S$GLB,, | Performed by: INTERNAL MEDICINE

## 2023-08-17 PROCEDURE — 3078F PR MOST RECENT DIASTOLIC BLOOD PRESSURE < 80 MM HG: ICD-10-PCS | Mod: CPTII,S$GLB,, | Performed by: INTERNAL MEDICINE

## 2023-08-17 PROCEDURE — 99999 PR PBB SHADOW E&M-EST. PATIENT-LVL IV: ICD-10-PCS | Mod: PBBFAC,,, | Performed by: INTERNAL MEDICINE

## 2023-08-17 PROCEDURE — 99213 PR OFFICE/OUTPT VISIT, EST, LEVL III, 20-29 MIN: ICD-10-PCS | Mod: S$GLB,,, | Performed by: INTERNAL MEDICINE

## 2023-08-17 PROCEDURE — 3074F PR MOST RECENT SYSTOLIC BLOOD PRESSURE < 130 MM HG: ICD-10-PCS | Mod: CPTII,S$GLB,, | Performed by: INTERNAL MEDICINE

## 2023-08-17 PROCEDURE — 3074F SYST BP LT 130 MM HG: CPT | Mod: CPTII,S$GLB,, | Performed by: INTERNAL MEDICINE

## 2023-08-17 PROCEDURE — 1101F PT FALLS ASSESS-DOCD LE1/YR: CPT | Mod: CPTII,S$GLB,, | Performed by: INTERNAL MEDICINE

## 2023-08-17 PROCEDURE — 1126F AMNT PAIN NOTED NONE PRSNT: CPT | Mod: CPTII,S$GLB,, | Performed by: INTERNAL MEDICINE

## 2023-08-17 PROCEDURE — 99999 PR PBB SHADOW E&M-EST. PATIENT-LVL IV: CPT | Mod: PBBFAC,,, | Performed by: INTERNAL MEDICINE

## 2023-08-17 PROCEDURE — 1159F MED LIST DOCD IN RCRD: CPT | Mod: CPTII,S$GLB,, | Performed by: INTERNAL MEDICINE

## 2023-08-17 NOTE — PROGRESS NOTES
Subjective:       Patient ID: Ezequiel Vegas is a 78 y.o. female.    Chief Complaint: Cerumen Impaction    Decreased hearing with feeling of ear blockage----------      Past Medical History:   Diagnosis Date    Allergy     Cataract     Gout     PVD (peripheral vascular disease) 4/28/2023     Past Surgical History:   Procedure Laterality Date    CATARACT EXTRACTION Right 05/10/2018    HYSTERECTOMY      NEL/BSO    VARICOSE VEIN SURGERY       Family History   Problem Relation Age of Onset    Cervical cancer Mother     Glaucoma Mother     Esophageal cancer Mother     Hypertension Mother     No Known Problems Father     Prostate cancer Maternal Uncle     Lupus Other     Kidney disease Other     Stroke Neg Hx     Diabetes Neg Hx      Social History     Socioeconomic History    Marital status:     Number of children: 0   Tobacco Use    Smoking status: Never    Smokeless tobacco: Never    Tobacco comments:     occasionally   Substance and Sexual Activity    Alcohol use: Yes     Comment: occasional    Drug use: No    Sexual activity: Never     Social Determinants of Health     Stress: No Stress Concern Present (6/25/2019)    [employer] - Occupational Stress Questionnaire     Feeling of Stress : Only a little     Review of Systems   Constitutional:  Negative for chills and fever.   Respiratory:  Negative for apnea, cough, choking, chest tightness, shortness of breath, wheezing and stridor.    Cardiovascular:  Negative for chest pain and palpitations.   Gastrointestinal:  Negative for abdominal pain, nausea and vomiting.   Neurological:  Negative for dizziness and weakness.   Psychiatric/Behavioral:  Negative for agitation, behavioral problems and confusion.        Objective:      Physical Exam  Vitals and nursing note reviewed.   Constitutional:       Appearance: Normal appearance.   HENT:      Right Ear: There is impacted cerumen.      Left Ear: There is impacted cerumen.    Cardiovascular:      Rate and Rhythm: Normal rate and regular rhythm.      Pulses: Normal pulses.      Heart sounds: Normal heart sounds.   Pulmonary:      Effort: Pulmonary effort is normal.      Breath sounds: Normal breath sounds.   Neurological:      Mental Status: She is alert.         CMP  Sodium   Date Value Ref Range Status   05/18/2023 141 136 - 145 mmol/L Final     Potassium   Date Value Ref Range Status   05/18/2023 4.8 3.5 - 5.1 mmol/L Final     Chloride   Date Value Ref Range Status   05/18/2023 109 95 - 110 mmol/L Final     CO2   Date Value Ref Range Status   05/18/2023 22 (L) 23 - 29 mmol/L Final     Glucose   Date Value Ref Range Status   05/18/2023 65 (L) 70 - 110 mg/dL Final     BUN   Date Value Ref Range Status   05/18/2023 16 8 - 23 mg/dL Final     Creatinine   Date Value Ref Range Status   05/18/2023 1.1 0.5 - 1.4 mg/dL Final     Calcium   Date Value Ref Range Status   05/18/2023 9.7 8.7 - 10.5 mg/dL Final     Total Protein   Date Value Ref Range Status   05/18/2023 7.9 6.0 - 8.4 g/dL Final     Albumin   Date Value Ref Range Status   05/18/2023 3.5 3.5 - 5.2 g/dL Final     Total Bilirubin   Date Value Ref Range Status   05/18/2023 0.4 0.1 - 1.0 mg/dL Final     Comment:     For infants and newborns, interpretation of results should be based  on gestational age, weight and in agreement with clinical  observations.    Premature Infant recommended reference ranges:  Up to 24 hours.............<8.0 mg/dL  Up to 48 hours............<12.0 mg/dL  3-5 days..................<15.0 mg/dL  6-29 days.................<15.0 mg/dL       Alkaline Phosphatase   Date Value Ref Range Status   05/18/2023 69 55 - 135 U/L Final     AST   Date Value Ref Range Status   05/18/2023 25 10 - 40 U/L Final     ALT   Date Value Ref Range Status   05/18/2023 18 10 - 44 U/L Final     Anion Gap   Date Value Ref Range Status   05/18/2023 10 8 - 16 mmol/L Final     eGFR if    Date Value Ref Range Status    02/08/2021 56.0 (A) >60 mL/min/1.73 m^2 Final     eGFR    Date Value Ref Range Status   04/04/2023 82 mL/min/1.73mSq Final     Comment:     In accordance with NKF-ASN Task Force recommendation, calculation based on the Chronic Kidney Disease Epidemiology Collaboration (CKD-EPI) equation without adjustment for race. eGFR adjusted for gender and age and calculated in ml/min/1.73mSquared. eGFR cannot be calculated if patient is under 18 years of age.     Reference Range:   >= 60 ml/min/1.73mSquared.     eGFR if non    Date Value Ref Range Status   02/08/2021 48.5 (A) >60 mL/min/1.73 m^2 Final     Comment:     Calculation used to obtain the estimated glomerular filtration  rate (eGFR) is the CKD-EPI equation.        Lab Results   Component Value Date    WBC 7.48 06/07/2023    HGB 11.4 (L) 06/07/2023    HCT 35.9 (L) 06/07/2023    MCV 93 06/07/2023     06/07/2023     Lab Results   Component Value Date    CHOL 211 (H) 10/13/2022     Lab Results   Component Value Date    HDL 71 10/13/2022     Lab Results   Component Value Date    LDLCALC 125.2 10/13/2022     Lab Results   Component Value Date    TRIG 74 10/13/2022     Lab Results   Component Value Date    CHOLHDL 33.6 10/13/2022     Lab Results   Component Value Date    TSH 0.669 10/13/2022     Lab Results   Component Value Date    HGBA1C 5.2 12/11/2020     Assessment:       1. Excessive cerumen in both ear canals    2. Hearing loss, unspecified hearing loss type, unspecified laterality        Plan:   Excessive cerumen in both ear canals  -     Ambulatory referral/consult to ENT; Future; Expected date: 08/24/2023    Hearing loss, unspecified hearing loss type, unspecified laterality  -     Ambulatory referral/consult to ENT; Future; Expected date: 08/24/2023    As above----------f/u as scheduled--------

## 2023-08-18 ENCOUNTER — OFFICE VISIT (OUTPATIENT)
Dept: OTOLARYNGOLOGY | Facility: CLINIC | Age: 78
End: 2023-08-18
Payer: MEDICARE

## 2023-08-18 DIAGNOSIS — H61.23 EXCESSIVE CERUMEN IN BOTH EAR CANALS: ICD-10-CM

## 2023-08-18 DIAGNOSIS — H91.90 HEARING LOSS, UNSPECIFIED HEARING LOSS TYPE, UNSPECIFIED LATERALITY: ICD-10-CM

## 2023-08-18 PROCEDURE — 99203 OFFICE O/P NEW LOW 30 MIN: CPT | Mod: S$GLB,,, | Performed by: STUDENT IN AN ORGANIZED HEALTH CARE EDUCATION/TRAINING PROGRAM

## 2023-08-18 PROCEDURE — 1159F PR MEDICATION LIST DOCUMENTED IN MEDICAL RECORD: ICD-10-PCS | Mod: CPTII,S$GLB,, | Performed by: STUDENT IN AN ORGANIZED HEALTH CARE EDUCATION/TRAINING PROGRAM

## 2023-08-18 PROCEDURE — 99999 PR PBB SHADOW E&M-EST. PATIENT-LVL II: CPT | Mod: PBBFAC,,, | Performed by: STUDENT IN AN ORGANIZED HEALTH CARE EDUCATION/TRAINING PROGRAM

## 2023-08-18 PROCEDURE — 99999 PR PBB SHADOW E&M-EST. PATIENT-LVL II: ICD-10-PCS | Mod: PBBFAC,,, | Performed by: STUDENT IN AN ORGANIZED HEALTH CARE EDUCATION/TRAINING PROGRAM

## 2023-08-18 PROCEDURE — 1101F PT FALLS ASSESS-DOCD LE1/YR: CPT | Mod: CPTII,S$GLB,, | Performed by: STUDENT IN AN ORGANIZED HEALTH CARE EDUCATION/TRAINING PROGRAM

## 2023-08-18 PROCEDURE — 99203 PR OFFICE/OUTPT VISIT, NEW, LEVL III, 30-44 MIN: ICD-10-PCS | Mod: S$GLB,,, | Performed by: STUDENT IN AN ORGANIZED HEALTH CARE EDUCATION/TRAINING PROGRAM

## 2023-08-18 PROCEDURE — 3288F PR FALLS RISK ASSESSMENT DOCUMENTED: ICD-10-PCS | Mod: CPTII,S$GLB,, | Performed by: STUDENT IN AN ORGANIZED HEALTH CARE EDUCATION/TRAINING PROGRAM

## 2023-08-18 PROCEDURE — 1159F MED LIST DOCD IN RCRD: CPT | Mod: CPTII,S$GLB,, | Performed by: STUDENT IN AN ORGANIZED HEALTH CARE EDUCATION/TRAINING PROGRAM

## 2023-08-18 PROCEDURE — 3288F FALL RISK ASSESSMENT DOCD: CPT | Mod: CPTII,S$GLB,, | Performed by: STUDENT IN AN ORGANIZED HEALTH CARE EDUCATION/TRAINING PROGRAM

## 2023-08-18 PROCEDURE — 1101F PR PT FALLS ASSESS DOC 0-1 FALLS W/OUT INJ PAST YR: ICD-10-PCS | Mod: CPTII,S$GLB,, | Performed by: STUDENT IN AN ORGANIZED HEALTH CARE EDUCATION/TRAINING PROGRAM

## 2023-08-18 NOTE — PROGRESS NOTES
Chief complaint:   Chief Complaint   Patient presents with    Ear Fullness     Both ears clogged, decreased hearing, itchy ears , 2 weeks of this feeling           Referring Provider:  Uri Matthews Md  [address] [place] [address]    History of Present Illness:     Ms. Vegas is a 78 y.o. female presenting for evaluation of itchy ears, fullness, decreased hearing. Onset of this chief complaint was about a few months ago.  Additional symptoms that also have been associated are muffled hearing. The patient has tried nothing with out relief.  The patient denies tinnitus, vertigo, drainage.      History        Past Medical History:   Past Medical History:   Diagnosis Date    Allergy     Cataract     Gout     PVD (peripheral vascular disease) 4/28/2023    .          Past Surgical History:  Past Surgical History:   Procedure Laterality Date    CATARACT EXTRACTION Right 05/10/2018    HYSTERECTOMY      NEL/BSO    VARICOSE VEIN SURGERY     .         Medications: Medication list was reviewed. She  has a current medication list which includes the following prescription(s): amlodipine, cilostazol, diclofenac sodium, eliquis, hydrocodone-acetaminophen, metoprolol succinate, pravastatin, and benzonatate.         Allergies:   Review of patient's allergies indicates:   Allergen Reactions    Penicillins      Other reaction(s): Itching            Family history: family history includes Cervical cancer in her mother; Esophageal cancer in her mother; Glaucoma in her mother; Hypertension in her mother; Kidney disease in an other family member; Lupus in an other family member; No Known Problems in her father; Prostate cancer in her maternal uncle.         Social History          Alcohol use:  reports current alcohol use.            Tobacco:  reports that she has quit smoking. Her smoking use included cigarettes. She has quit using smokeless tobacco.         Please see the patient's intake form for full details  of past medical history, past surgical history, family history, social history and review of systems. ?This information was reviewed by me and noted.      Physical Examination     General: Well developed, well nourished, well hydrated. Verbal with a strong voice and not dysphonic.     Head/Face: Normocephalic, atraumatic. No scars or lesions. Facial musculature equal.     Eyes: No scleral icterus or conjunctival hemorrhage. EOMI. PERRLA.     Ears: after disimpaction     Right ear: No gross deformity. EAC is clear of debris and erythema. The TM is intact with a pneumatized middle ear. No signs of retraction, fluid or infection.      Left ear: No gross deformity. EAC is clear of debris and erythema. The TM is intact with a pneumatized middle ear. No signs of retraction, fluid or infection.     Hearing: grossly intact      Neurologic: Moving all extremities without gross abnormality.CN II-XII grossly intact. House-Brackmann 1/6. No signs of nystagmus.      Psych: Alert and oriented to person, place, and time with an appropriate mood and affect.     Data review:    Review of records:      I reviewed records from the referring provider's office visits.  These describe the history, workup, and/or treatment of this problem thus far.    Procedure: ear microscopy with removal of cerumen    Description: The patient was in agreement with the examination and debridement of the ears. Removal of the cerumen required use of an operating microscope and multiple micro-instruments.     With the patient in the supine position, we used the operating microscope to examine both ears with the appropriate sized ear speculum.  A variety of sterile, micro-instruments were utilized to remove the cerumen atraumatically.  I performed the procedure which required a significant amount of time and effort. The tympanic membrane was then well visualized.  The patient tolerated the procedure well and there were no complications.    Findings:   Right  ear had significant wax, the EAC was normal, and the tympanic membrane was intact with no evidence of middle ear fluid.    Left ear had significant wax, the EAC was normal, and the tympanic membrane was intact with no evidence of middle ear fluid.         Assessment/Plan:      1. Excessive cerumen in both ear canals    2. Hearing loss, unspecified hearing loss type, unspecified laterality           Cerumen Impaction  We discussed preventative measures and treatment options.  Q-tips must be avoided, instead the ears can be cleaned with OTC ear rinses (or mineral oil).  If the cerumen impacts the ear canal and causes hearing loss or infection she needs to follow-up in the clinic for treatment and cleaning. Due to severity, I do recommend repeat cleaning in 6 months.    If hearing loss persists after disimpaction they will let us know and we will schedule audio.     Marquise Merrill MD  [facility]   [facility]  [address]  [place] [address]  P: [phone]  F: [phone]

## 2023-08-28 NOTE — TELEPHONE ENCOUNTER
Called and LVM letting pt know her preferred pharmacy has been updated and verified. If needed she can return call to the clinic.     ----- Message from  Janet sent at 8/26/2023 11:08 AM CDT -----  Type:  Needs Medical Advice    Who Called: Pt   Would the patient rather a call back or a response via [facility]? Callback  Best Call Back Number: [phone]  Additional Information: Pt requesting callback from office to move prescriptions from [facility] pharmacy  to [place] [address] [facility].

## 2023-08-29 RX ORDER — AMLODIPINE BESYLATE 5 MG/1
5 TABLET ORAL DAILY
Qty: 30 TABLET | Refills: 3 | Status: SHIPPED | OUTPATIENT
Start: 2023-08-29

## 2023-08-29 RX ORDER — METOPROLOL SUCCINATE 50 MG/1
50 TABLET, EXTENDED RELEASE ORAL DAILY
Qty: 30 TABLET | Refills: 3 | Status: SHIPPED | OUTPATIENT
Start: 2023-08-29

## 2023-08-29 NOTE — TELEPHONE ENCOUNTER
Rx request sent to Dr. Joyce.    ----- Message from Suzanne Nj sent at 8/29/2023  7:31 AM CDT -----  Regarding: RX Refill  Contact: Emmie  Type:  RX Refill Request    Who Called: Emmie   Refill or New Rx: refill   RX Name and Strength:metoprolol succinate (TOPROL-XL) 50 MG 24 hr tablet  How is the patient currently taking it? (ex. 1XDay): Take 1 tablet (50 mg total) by mouth once daily. - Oral  Is this a 30 day or 90 day RX: 30    Who Called:   Refill or New Rx: refill   RX Name and Strength:amLODIPine (NORVASC) 5 MG tablet  How is the patient currently taking it? (ex. 1XDay):Take 1 tablet (5 mg total) by mouth once daily. - Oral  Is this a 30 day or 90 day RX: 30    Preferred Pharmacy with phone number:   [facility] [address] [place] [address]  Phone: [phone] Fax: [phone]     Local or Mail Order: local   Ordering Provider: SISI Joyce   Would the patient rather a call back or a response via [facility]? call  Best Call Back Number: [phone] (home)    Additional Information: The patient would like to have the 90 days supply for each if covered.

## 2024-01-31 DIAGNOSIS — Z78.0 MENOPAUSE: ICD-10-CM

## 2024-02-06 DIAGNOSIS — I73.9 PAD (PERIPHERAL ARTERY DISEASE): Primary | ICD-10-CM

## 2024-02-06 DIAGNOSIS — I10 PRIMARY HYPERTENSION: ICD-10-CM

## 2024-05-20 ENCOUNTER — TELEPHONE (OUTPATIENT)
Dept: FAMILY MEDICINE | Facility: CLINIC | Age: 79
End: 2024-05-20
Payer: COMMERCIAL

## 2024-05-20 DIAGNOSIS — E78.5 DYSLIPIDEMIA: Primary | ICD-10-CM

## 2024-05-20 NOTE — TELEPHONE ENCOUNTER
----- Message from Che Walton sent at 5/20/2024  1:59 PM CDT -----  Contact: Leyla Rivera, [phone]  type: Lab    Caller is requesting to schedule their Lab appointment prior to annual appointment.  Order is not listed in EPIC.  Please enter order and contact patient to schedule.    Date of Annual Physical Appointment: 06/21/2024    Where would they like the lab performed? [facility]    Would the patient rather a call back or a response via [facility]?  Call back

## 2024-06-14 ENCOUNTER — TELEPHONE (OUTPATIENT)
Dept: FAMILY MEDICINE | Facility: CLINIC | Age: 79
End: 2024-06-14
Payer: COMMERCIAL

## 2024-06-14 ENCOUNTER — TELEPHONE (OUTPATIENT)
Dept: HEMATOLOGY/ONCOLOGY | Facility: CLINIC | Age: 79
End: 2024-06-14
Payer: COMMERCIAL

## 2024-06-14 NOTE — TELEPHONE ENCOUNTER
N/a nurse lvm for pt to call back in regards to rescheduling due missed lab appt , and the appt being scheduled incorrectly.

## 2024-06-14 NOTE — TELEPHONE ENCOUNTER
----- Message from Fauzia Constantino sent at 6/14/2024  4:45 PM CDT -----  Contact: self  Patient requesting a call back regarding home health. Please call back @ [phone]

## 2024-07-15 ENCOUNTER — TELEPHONE (OUTPATIENT)
Dept: FAMILY MEDICINE | Facility: CLINIC | Age: 79
End: 2024-07-15

## 2024-07-15 NOTE — TELEPHONE ENCOUNTER
----- Message from Uri Matthews MD sent at 7/15/2024 11:00 AM CDT -----  Would need appt------have not seen in 1 year-  ----- Message -----  From: Sammy Milton MA  Sent: 7/15/2024   9:39 AM CDT  To: Uri Matthews MD    Pt called in to get approved for home health. Please advise.  ----- Message -----  From: Franklin Jarquin  Sent: 7/15/2024   8:52 AM CDT  To: Glory Lucas Staff    .Type:  Patient Returning Call    Who Called:Ezequiel  Who Left Message for Patient:Nurse  Does the patient know what this is regarding?:Yes  Would the patient rather a call back or a response via [facility]? Call back  Best Call Back Number: .[phone]     Additional Information: Na      Thanks  [id number]

## 2024-07-17 NOTE — TELEPHONE ENCOUNTER
----- Message from Joleen Lassiter MA sent at 7/17/2024  9:50 AM CDT -----  Regarding: FW: sister    ----- Message -----  From: Caridad Dillon  Sent: 7/16/2024   4:57 PM CDT  To: Glory Lucas Staff  Subject: sister                                           Type:  Patient Returning Call         Who Called: sister       Who Left Message for Patient: pt not sure       Does the patient know what this is regarding?: yes        Would the patient rather a call back or a response via [facility]? Call back     Best Call Back Number:[phone]

## 2024-07-30 ENCOUNTER — OFFICE VISIT (OUTPATIENT)
Dept: FAMILY MEDICINE | Facility: CLINIC | Age: 79
End: 2024-07-30
Payer: COMMERCIAL

## 2024-07-30 VITALS
OXYGEN SATURATION: 99 % | DIASTOLIC BLOOD PRESSURE: 78 MMHG | RESPIRATION RATE: 18 BRPM | HEIGHT: 65 IN | BODY MASS INDEX: 18.99 KG/M2 | SYSTOLIC BLOOD PRESSURE: 126 MMHG | HEART RATE: 82 BPM | WEIGHT: 114 LBS | TEMPERATURE: 97 F

## 2024-07-30 DIAGNOSIS — I73.9 PVD (PERIPHERAL VASCULAR DISEASE): ICD-10-CM

## 2024-07-30 DIAGNOSIS — I48.91 ATRIAL FIBRILLATION, UNSPECIFIED TYPE: ICD-10-CM

## 2024-07-30 DIAGNOSIS — M17.0 PRIMARY OSTEOARTHRITIS OF BOTH KNEES: Primary | ICD-10-CM

## 2024-07-30 DIAGNOSIS — R05.9 COUGH, UNSPECIFIED TYPE: ICD-10-CM

## 2024-07-30 DIAGNOSIS — E78.5 DYSLIPIDEMIA: ICD-10-CM

## 2024-07-30 DIAGNOSIS — I10 PRIMARY HYPERTENSION: ICD-10-CM

## 2024-07-30 DIAGNOSIS — I48.0 PAF (PAROXYSMAL ATRIAL FIBRILLATION): ICD-10-CM

## 2024-07-30 PROCEDURE — 99214 OFFICE O/P EST MOD 30 MIN: CPT | Mod: S$GLB,,, | Performed by: NURSE PRACTITIONER

## 2024-07-30 PROCEDURE — 3078F DIAST BP <80 MM HG: CPT | Mod: CPTII,S$GLB,, | Performed by: NURSE PRACTITIONER

## 2024-07-30 PROCEDURE — 3288F FALL RISK ASSESSMENT DOCD: CPT | Mod: CPTII,S$GLB,, | Performed by: NURSE PRACTITIONER

## 2024-07-30 PROCEDURE — 99999 PR PBB SHADOW E&M-EST. PATIENT-LVL III: CPT | Mod: PBBFAC,,, | Performed by: NURSE PRACTITIONER

## 2024-07-30 PROCEDURE — 1160F RVW MEDS BY RX/DR IN RCRD: CPT | Mod: CPTII,S$GLB,, | Performed by: NURSE PRACTITIONER

## 2024-07-30 PROCEDURE — 1159F MED LIST DOCD IN RCRD: CPT | Mod: CPTII,S$GLB,, | Performed by: NURSE PRACTITIONER

## 2024-07-30 PROCEDURE — 1101F PT FALLS ASSESS-DOCD LE1/YR: CPT | Mod: CPTII,S$GLB,, | Performed by: NURSE PRACTITIONER

## 2024-07-30 PROCEDURE — 3074F SYST BP LT 130 MM HG: CPT | Mod: CPTII,S$GLB,, | Performed by: NURSE PRACTITIONER

## 2024-07-30 RX ORDER — METOPROLOL SUCCINATE 50 MG/1
50 TABLET, EXTENDED RELEASE ORAL DAILY
Qty: 30 TABLET | Refills: 3 | Status: SHIPPED | OUTPATIENT
Start: 2024-07-30

## 2024-07-30 RX ORDER — BENZONATATE 200 MG/1
200 CAPSULE ORAL 3 TIMES DAILY PRN
Qty: 30 CAPSULE | Refills: 1 | Status: SHIPPED | OUTPATIENT
Start: 2024-07-30

## 2024-07-30 RX ORDER — DICLOFENAC SODIUM 10 MG/G
2 GEL TOPICAL 2 TIMES DAILY
Qty: 60 G | Refills: 2 | Status: SHIPPED | OUTPATIENT
Start: 2024-07-30

## 2024-07-30 RX ORDER — PRAVASTATIN SODIUM 40 MG/1
40 TABLET ORAL DAILY
Qty: 90 TABLET | Refills: 3 | Status: SHIPPED | OUTPATIENT
Start: 2024-07-30 | End: 2025-07-30

## 2024-07-30 RX ORDER — CILOSTAZOL 50 MG/1
50 TABLET ORAL 2 TIMES DAILY
Qty: 60 TABLET | Refills: 11 | Status: SHIPPED | OUTPATIENT
Start: 2024-07-30 | End: 2025-07-30

## 2024-07-30 RX ORDER — APIXABAN 2.5 MG/1
2.5 TABLET, FILM COATED ORAL 2 TIMES DAILY
Qty: 180 TABLET | Refills: 1 | Status: SHIPPED | OUTPATIENT
Start: 2024-07-30

## 2024-07-30 RX ORDER — AMLODIPINE BESYLATE 5 MG/1
5 TABLET ORAL DAILY
Qty: 30 TABLET | Refills: 3 | Status: SHIPPED | OUTPATIENT
Start: 2024-07-30

## 2024-07-30 NOTE — PROGRESS NOTES
Ezequiel Vegas  07/30/2024  [id number]    Uri Matthews MD  Patient Care Team:  Uri Matthews MD as PCP - General (Internal Medicine)          Visit Type:a scheduled routine follow-up visit    Chief Complaint:  Chief Complaint   Patient presents with    Follow-up       History of Present Illness:    80 yo female presents today requesting multiple medication refills.   She has no other complaints or request at this time       History:  Past Medical History:   Diagnosis Date    Allergy     Cataract     Gout     PVD (peripheral vascular disease) 4/28/2023     Past Surgical History:   Procedure Laterality Date    CATARACT EXTRACTION Right 05/10/2018    HYSTERECTOMY      NEL/BSO    VARICOSE VEIN SURGERY       Family History   Problem Relation Name Age of Onset    Cervical cancer Mother      Glaucoma Mother      Esophageal cancer Mother      Hypertension Mother      No Known Problems Father      Prostate cancer Maternal Uncle      Lupus Other Niece     Kidney disease Other Niece     Stroke Neg Hx      Diabetes Neg Hx       Social History     Socioeconomic History    Marital status:     Number of children: 0   Tobacco Use    Smoking status: Some Days     Types: Cigarettes    Smokeless tobacco: Former    Tobacco comments:     occasionally   Substance and Sexual Activity    Alcohol use: Yes     Comment: occasional    Drug use: No    Sexual activity: Never     Social Determinants of Health     Financial Resource Strain: Low Risk  (7/21/2024)    Overall Financial Resource Strain (CARDIA)     Difficulty of Paying Living Expenses: Not hard at all   Food Insecurity: No Food Insecurity (7/21/2024)    Hunger Vital Sign     Worried About Running Out of Food in the Last Year: Never true     Ran Out of Food in the Last Year: Never true   Transportation Needs: No Transportation Needs (3/31/2023)    Received from [facility]     PRAPARE - Transportation     Lack of Transportation (Medical): No     Lack of Transportation (Non-Medical): No   Physical Activity: Unknown (7/21/2024)    Exercise Vital Sign     Days of Exercise per Week: 2 days   Stress: No Stress Concern Present (7/21/2024)    [employer] - Occupational Stress Questionnaire     Feeling of Stress : Not at all   Housing Stability: Unknown (7/21/2024)    Housing Stability Vital Sign     Unable to Pay for Housing in the Last Year: No     Patient Active Problem List   Diagnosis    CKD (chronic kidney disease) stage 3, GFR 30-59 ml/min    Dyslipidemia    Memory changes    Tobacco use    Primary osteoarthritis of both knees    A-fib    HTN (hypertension)    Debility    PVD (peripheral vascular disease)    PAD (peripheral artery disease)    Anticoagulated    Normocytic anemia     Review of patient's allergies indicates:   Allergen Reactions    Penicillins      Other reaction(s): Itching       The following were reviewed at this visit: active problem list, medication list, allergies, family history, social history, and health maintenance.    Medications:  Current Outpatient Medications on File Prior to Visit   Medication Sig Dispense Refill    HYDROcodone-acetaminophen (NORCO) 5-325 mg per tablet Take 1 tablet by mouth 4 (four) times daily as needed.      [DISCONTINUED] amLODIPine (NORVASC) 5 MG tablet Take 1 tablet (5 mg total) by mouth once daily. 30 tablet 3    [DISCONTINUED] benzonatate (TESSALON) 200 MG capsule Take 200 mg by mouth 3 (three) times daily as needed for Cough.      [DISCONTINUED] diclofenac sodium (VOLTAREN) 1 % Gel Apply 2 g topically 2 (two) times daily. 60 g 2    [DISCONTINUED] ELIQUIS 2.5 mg Tab Take 1 tablet (2.5 mg total) by mouth 2 (two) times daily. 180 tablet 1    [DISCONTINUED] metoprolol succinate (TOPROL-XL) 50 MG 24 hr tablet Take 1 tablet (50 mg total) by mouth once daily. 30 tablet 3    [DISCONTINUED] cilostazoL (PLETAL) 50 MG  Tab Take 1 tablet (50 mg total) by mouth 2 (two) times daily. 60 tablet 11    [DISCONTINUED] pravastatin (PRAVACHOL) 40 MG tablet Take 1 tablet (40 mg total) by mouth once daily. 90 tablet 3     No current facility-administered medications on file prior to visit.       Medications have been reviewed and reconciled with patient at this visit.  Barriers to medications reviewed with patient.    Adverse reactions to current medications reviewed with patient..    Over the counter medications reviewed and reconciled with patient.    Exam:  Wt Readings from Last 3 Encounters:   07/30/24 51.7 kg (113 lb 15.7 oz)   08/17/23 53 kg (116 lb 13.5 oz)   07/28/23 53.4 kg (117 lb 11.6 oz)     Temp Readings from Last 3 Encounters:   07/30/24 97.3 °F (36.3 °C) (Tympanic)   08/17/23 97.9 °F (36.6 °C)   07/13/23 97.8 °F (36.6 °C) (Temporal)     BP Readings from Last 3 Encounters:   07/30/24 126/78   08/17/23 118/70   07/28/23 122/72     Pulse Readings from Last 3 Encounters:   07/30/24 82   08/17/23 60   07/28/23 (!) 58     Body mass index is 18.97 kg/m².      Review of Systems   Constitutional:  Negative for fever.   Respiratory:  Negative for cough, shortness of breath and wheezing.    Cardiovascular:  Negative for chest pain and palpitations.   Gastrointestinal:  Negative for nausea.   Neurological:  Negative for speech change, weakness and headaches.   All other systems reviewed and are negative.    Physical Exam  Vitals and nursing note reviewed.   Constitutional:       Appearance: Normal appearance. She is obese.   HENT:      Head: Normocephalic and atraumatic.      Right Ear: Tympanic membrane, ear canal and external ear normal.      Left Ear: Tympanic membrane, ear canal and external ear normal.      Nose: Nose normal.      Mouth/Throat:      Mouth: Mucous membranes are moist.      Pharynx: Oropharynx is clear.   Eyes:      Extraocular Movements: Extraocular movements intact.      Conjunctiva/sclera: Conjunctivae normal.       Pupils: Pupils are equal, round, and reactive to light.   Cardiovascular:      Rate and Rhythm: Normal rate and regular rhythm.      Pulses: Normal pulses.      Heart sounds: Normal heart sounds.   Pulmonary:      Effort: Pulmonary effort is normal.      Breath sounds: Normal breath sounds.   Abdominal:      General: Bowel sounds are normal.      Palpations: Abdomen is soft.   Musculoskeletal:         General: Normal range of motion.      Cervical back: Normal range of motion and neck supple.   Skin:     General: Skin is warm and dry.      Capillary Refill: Capillary refill takes less than 2 seconds.   Neurological:      General: No focal deficit present.      Mental Status: She is alert and oriented to person, place, and time.   Psychiatric:         Mood and Affect: Mood normal.         Behavior: Behavior normal.         Thought Content: Thought content normal.         Judgment: Judgment normal.         Laboratory Reviewed ({Yes)  Lab Results   Component Value Date    WBC 7.48 06/07/2023    HGB 11.4 (L) 06/07/2023    HCT 35.9 (L) 06/07/2023     06/07/2023    CHOL 211 (H) 10/13/2022    TRIG 74 10/13/2022    HDL 71 10/13/2022    ALT 18 05/18/2023    AST 25 05/18/2023     05/18/2023    K 4.8 05/18/2023     05/18/2023    CREATININE 1.1 05/18/2023    BUN 16 05/18/2023    CO2 22 (L) 05/18/2023    TSH 0.669 10/13/2022    HGBA1C 5.2 12/11/2020       Ezequiel was seen today for follow-up.    Diagnoses and all orders for this visit:    Primary osteoarthritis of both knees  -     diclofenac sodium (VOLTAREN) 1 % Gel; Apply 2 g topically 2 (two) times daily.    Atrial fibrillation, unspecified type  -     ELIQUIS 2.5 mg Tab; Take 1 tablet (2.5 mg total) by mouth 2 (two) times daily.    PAF (paroxysmal atrial fibrillation)  -     ELIQUIS 2.5 mg Tab; Take 1 tablet (2.5 mg total) by mouth 2 (two) times daily.    Dyslipidemia  -     pravastatin (PRAVACHOL) 40 MG tablet; Take 1 tablet (40 mg total) by mouth once  daily.    PVD (peripheral vascular disease)  -     cilostazoL (PLETAL) 50 MG Tab; Take 1 tablet (50 mg total) by mouth 2 (two) times daily.    Cough, unspecified type  -     benzonatate (TESSALON) 200 MG capsule; Take 1 capsule (200 mg total) by mouth 3 (three) times daily as needed for Cough.    Primary hypertension  -     amLODIPine (NORVASC) 5 MG tablet; Take 1 tablet (5 mg total) by mouth once daily.  -     metoprolol succinate (TOPROL-XL) 50 MG 24 hr tablet; Take 1 tablet (50 mg total) by mouth once daily.        Plan   Multiple Medication refills         Care Plan/Goals: Reviewed    Goals    None     Ezequiel was seen today for follow-up.    Diagnoses and all orders for this visit:    Primary osteoarthritis of both knees  -     diclofenac sodium (VOLTAREN) 1 % Gel; Apply 2 g topically 2 (two) times daily.    Atrial fibrillation, unspecified type  -     ELIQUIS 2.5 mg Tab; Take 1 tablet (2.5 mg total) by mouth 2 (two) times daily.    PAF (paroxysmal atrial fibrillation)  -     ELIQUIS 2.5 mg Tab; Take 1 tablet (2.5 mg total) by mouth 2 (two) times daily.    Dyslipidemia  -     pravastatin (PRAVACHOL) 40 MG tablet; Take 1 tablet (40 mg total) by mouth once daily.    PVD (peripheral vascular disease)  -     cilostazoL (PLETAL) 50 MG Tab; Take 1 tablet (50 mg total) by mouth 2 (two) times daily.    Cough, unspecified type  -     benzonatate (TESSALON) 200 MG capsule; Take 1 capsule (200 mg total) by mouth 3 (three) times daily as needed for Cough.    Primary hypertension  -     amLODIPine (NORVASC) 5 MG tablet; Take 1 tablet (5 mg total) by mouth once daily.  -     metoprolol succinate (TOPROL-XL) 50 MG 24 hr tablet; Take 1 tablet (50 mg total) by mouth once daily.         Follow up: Follow up for follow up 6 months with Dr. Matthews.    After visit summary was printed and given to patient upon discharge today.  Patient goals and care plan are included in After Visit Summary.

## 2024-08-01 ENCOUNTER — TELEPHONE (OUTPATIENT)
Dept: FAMILY MEDICINE | Facility: CLINIC | Age: 79
End: 2024-08-01
Payer: COMMERCIAL

## 2024-08-01 NOTE — TELEPHONE ENCOUNTER
Pt would like a stand up walker, the ones that you put your arms in and faxed [phone].  Pt also would nicotine patches sent to pharmacy. She also want handicap tag mailed to her home,[address] [address].

## 2024-08-01 NOTE — TELEPHONE ENCOUNTER
----- Message from Jamilah Willett sent at 8/1/2024  3:19 PM CDT -----  Contact: Ezequiel Nieves is needing a call back in regards to questions about getting a walker. Please give her a callback at [phone]

## 2024-08-02 DIAGNOSIS — R26.81 UNSTEADY GAIT: Primary | ICD-10-CM

## 2024-08-12 ENCOUNTER — TELEPHONE (OUTPATIENT)
Dept: FAMILY MEDICINE | Facility: CLINIC | Age: 79
End: 2024-08-12
Payer: COMMERCIAL

## 2024-08-12 NOTE — TELEPHONE ENCOUNTER
----- Message from Cristy Willett sent at 8/12/2024 12:50 PM CDT -----  Contact: Ezequiel  Pt is calling in regards to getting a stand up walker, the ones that you put your arms in and faxed [phone].  Pt also would nicotine patches sent to pharmacy. She also want handicap tag mailed to her home,[address] [facility] [address] [facility] [address].          .  [facility] [place] - [address] [facility]  [address] [facility] [address]  Phone: [phone] Fax: [phone]    Thanks  JENS

## 2024-08-12 NOTE — TELEPHONE ENCOUNTER
Mailed handicap ls form to patient left a message at [facility] to see if they received walker order

## 2024-08-21 ENCOUNTER — TELEPHONE (OUTPATIENT)
Dept: FAMILY MEDICINE | Facility: CLINIC | Age: 79
End: 2024-08-21
Payer: COMMERCIAL

## 2024-08-21 ENCOUNTER — OFFICE VISIT (OUTPATIENT)
Dept: FAMILY MEDICINE | Facility: CLINIC | Age: 79
End: 2024-08-21
Payer: COMMERCIAL

## 2024-08-21 VITALS
OXYGEN SATURATION: 94 % | TEMPERATURE: 98 F | DIASTOLIC BLOOD PRESSURE: 70 MMHG | SYSTOLIC BLOOD PRESSURE: 124 MMHG | BODY MASS INDEX: 17.99 KG/M2 | WEIGHT: 108 LBS | HEIGHT: 65 IN | HEART RATE: 84 BPM | RESPIRATION RATE: 18 BRPM

## 2024-08-21 DIAGNOSIS — I10 PRIMARY HYPERTENSION: ICD-10-CM

## 2024-08-21 DIAGNOSIS — N18.31 STAGE 3A CHRONIC KIDNEY DISEASE: ICD-10-CM

## 2024-08-21 DIAGNOSIS — R41.3 MEMORY CHANGES: ICD-10-CM

## 2024-08-21 DIAGNOSIS — Z79.01 CURRENT USE OF LONG TERM ANTICOAGULATION: ICD-10-CM

## 2024-08-21 DIAGNOSIS — I73.9 PAD (PERIPHERAL ARTERY DISEASE): ICD-10-CM

## 2024-08-21 DIAGNOSIS — M17.0 OSTEOARTHRITIS OF BOTH KNEES, UNSPECIFIED OSTEOARTHRITIS TYPE: ICD-10-CM

## 2024-08-21 DIAGNOSIS — R63.0 DECREASE IN APPETITE: ICD-10-CM

## 2024-08-21 DIAGNOSIS — I73.9 PVD (PERIPHERAL VASCULAR DISEASE): ICD-10-CM

## 2024-08-21 DIAGNOSIS — M17.0 PRIMARY OSTEOARTHRITIS OF BOTH KNEES: ICD-10-CM

## 2024-08-21 DIAGNOSIS — I48.91 ATRIAL FIBRILLATION, UNSPECIFIED TYPE: ICD-10-CM

## 2024-08-21 DIAGNOSIS — Z72.0 TOBACCO USE: ICD-10-CM

## 2024-08-21 DIAGNOSIS — I73.9 PAD (PERIPHERAL ARTERY DISEASE): Primary | ICD-10-CM

## 2024-08-21 DIAGNOSIS — R53.81 DEBILITY: ICD-10-CM

## 2024-08-21 DIAGNOSIS — Z99.89 DEPENDENCE ON OTHER ENABLING MACHINES AND DEVICES: ICD-10-CM

## 2024-08-21 DIAGNOSIS — Z00.00 ENCOUNTER FOR PREVENTIVE HEALTH EXAMINATION: Primary | ICD-10-CM

## 2024-08-21 PROCEDURE — 1160F RVW MEDS BY RX/DR IN RCRD: CPT | Mod: CPTII,S$GLB,, | Performed by: NURSE PRACTITIONER

## 2024-08-21 PROCEDURE — 1125F AMNT PAIN NOTED PAIN PRSNT: CPT | Mod: CPTII,S$GLB,, | Performed by: NURSE PRACTITIONER

## 2024-08-21 PROCEDURE — 3078F DIAST BP <80 MM HG: CPT | Mod: CPTII,S$GLB,, | Performed by: NURSE PRACTITIONER

## 2024-08-21 PROCEDURE — 1170F FXNL STATUS ASSESSED: CPT | Mod: CPTII,S$GLB,, | Performed by: NURSE PRACTITIONER

## 2024-08-21 PROCEDURE — 1159F MED LIST DOCD IN RCRD: CPT | Mod: CPTII,S$GLB,, | Performed by: NURSE PRACTITIONER

## 2024-08-21 PROCEDURE — 3074F SYST BP LT 130 MM HG: CPT | Mod: CPTII,S$GLB,, | Performed by: NURSE PRACTITIONER

## 2024-08-21 PROCEDURE — 99999 PR PBB SHADOW E&M-EST. PATIENT-LVL IV: CPT | Mod: PBBFAC,,, | Performed by: NURSE PRACTITIONER

## 2024-08-21 PROCEDURE — G0439 PPPS, SUBSEQ VISIT: HCPCS | Mod: S$GLB,,, | Performed by: NURSE PRACTITIONER

## 2024-08-21 NOTE — TELEPHONE ENCOUNTER
Pt has tried cane------does not effectively help with ADL's--------or pt mobility--------rollater walker ordered-----

## 2024-08-21 NOTE — TELEPHONE ENCOUNTER
"----- Message from Felicia Alvarez sent at 8/21/2024 12:14 PM CDT -----  Regarding: Rollator/Walker  Good Morning, patient states she has not heard from [facility] regarding rollator/walker. I called [phone] ([facility]) fredo Joe she states insurance does not accept R or Z codes. States what Dr. Matthews listed was "unsteady gait" which is a symptom not a diagnosis. It has to be a medical illness such as Stroke, COPD, Fracture, Head Issue, Arthritis. and/or PVD. She states Dr. Matthews has to resubmit the order along with progress notes stating patient has tried first using other assistive devices such as a cane so that it can be ruled out. Please call patient at [phone] once new order has been placed so that she can check next week on status. Thanks/elr  "

## 2024-08-21 NOTE — PROGRESS NOTES
"  Ezequiel Vegas presented for a follow-up Medicare AWV today. The following components were reviewed and updated:  Patient accompanied by  sister and niece , who was present throughout the visit and aided in information gathering.        Medical history  Family History  Social history  Allergies and Current Medications  Health Risk Assessment  Health Maintenance  Care Team    **See Completed Assessments for Annual Wellness visit with in the encounter summary    The following assessments were completed:  Depression Screening  Cognitive function Screening  Timed Get Up Test  Whisper Test      Opioid documentation:      Patient does not have a current opioid prescription.          Vitals:    08/21/24 1124   BP: 124/70   BP Location: Right arm   Patient Position: Lying   Pulse: 84   Resp: 18   Temp: 98.4 °F (36.9 °C)   SpO2: (!) 94%   Weight: 49 kg (108 lb 0.4 oz)   Height: 5' 5" (1.651 m)     Body mass index is 17.98 kg/m².       Physical Exam  Constitutional:       Appearance: Normal appearance. She is underweight.   HENT:      Head: Normocephalic.   Cardiovascular:      Rate and Rhythm: Normal rate. Rhythm irregular.   Musculoskeletal:         General: Normal range of motion.   Skin:     General: Skin is warm.   Neurological:      General: No focal deficit present.      Mental Status: She is alert and oriented to person, place, and time.      Motor: Weakness present.      Gait: Gait abnormal.   Psychiatric:         Mood and Affect: Affect is flat.         Speech: Speech is delayed.         Behavior: Behavior is slowed.         Cognition and Memory: Cognition is impaired.       Current Outpatient Medications   Medication Instructions    amLODIPine (NORVASC) 5 mg, Oral, Daily    benzonatate (TESSALON) 200 mg, Oral, 3 times daily PRN    cilostazoL (PLETAL) 50 mg, Oral, 2 times daily    diclofenac sodium (VOLTAREN) 2 g, Topical (Top), 2 times daily    ELIQUIS 2.5 mg, Oral, 2 times daily    HYDROcodone-acetaminophen " (NORCO) 5-325 mg per tablet 1 tablet, Oral, 4 times daily PRN    metoprolol succinate (TOPROL-XL) 50 mg, Oral, Daily    pravastatin (PRAVACHOL) 40 mg, Oral, Daily         Diagnoses and health risks identified today and associated recommendations/orders:  1. Encounter for preventive health examination  Recommend RSV,Covid and FLU    2. Atrial fibrillation, unspecified type  Chronic and Stable on Metoprol. Pletal and Elquis. Continue current treatment plan as previously prescribed with your  card       3. Current use of long term anticoagulation  Chronic and Stable no s/s of bleeding. Continue current treatment plan as previously prescribed with your PCP    4. PAD (peripheral artery disease)  Chronic and Stable on Elquis. Pletal and Pravastatin. Continue current treatment plan as previously prescribed with your card    5. PVD (peripheral vascular disease)  Chronic and Stable on Elquis, Pletal and Pravastatin. Continue current treatment plan as previously prescribed with your card    6. Primary hypertension  Chronic and Stable on Metoprol  and Norvasc. Continue current treatment plan as previously prescribed with your  card     7. Stage 3a chronic kidney disease  Chronic and Stable renal functyon. Continue current treatment plan as previously prescribed with your PCP      8. Decrease in appetite  5lbs wt loss noted in last month  Family state appetite up and down  Encourage mVI  and oral supplents  Encoaurge wt checks at home weekly    9. Memory changes  Chronic and Stable.  Forget full- congused at times- heard of hearing -family state unchaged in last yearl    10Debility   Chronic and Ongoing.-weaker knees  Use walker waitn on rx for rollator-sent message to scheduled    11. Primary osteoarthritis of both knees  Chronic and Ongoing 0using voaltrn only. Continue current treatment plan as previously prescribed with your PCP      12. Dependence on other enabling machines and devices  Chronic and Ongoing.-weaker  knees  Use walker waitn on rx for rollator-sent message to scheduled    13. Tobacco use  Chronic and Ongoing.   Will get otc Nictone [atch    Provided Dolorius with a 5-10 year written screening schedule and personal prevention plan. Recommendations were developed using the USPSTF age appropriate recommendations. Education, counseling, and referrals were provided as needed.  After Visit Summary printed and given to patient which includes a list of additional screenings\tests needed.    No follow-ups on file.    Aura Romero NP

## 2024-08-21 NOTE — PATIENT INSTRUCTIONS
Counseling and Referral of Other Preventative  (Italic type indicates deductible and co-insurance are waived)    Patient Name: Ezequiel Vegas  Today's Date: 8/21/2024    Health Maintenance       Date Due Completion Date    DEXA Scan Never done ---    Shingles Vaccine (1 of 2) Never done ---    RSV Vaccine (Age 60+ and Pregnant patients) (1 - 1-dose 60+ series) Never done ---    COVID-19 Vaccine (8 - 2023-24 season) 09/01/2023 11/21/2022    Influenza Vaccine (1) 09/01/2024 10/12/2022    Lipid Panel 10/13/2027 10/13/2022    TETANUS VACCINE 05/12/2028 5/12/2018    Override on 5/12/2018: Done        No orders of the defined types were placed in this encounter.    The following information is provided to all patients.  This information is to help you find resources for any of the problems found today that may be affecting your health:                  Living healthy guide: www.[email or web address].[place].[facility]      Understanding Diabetes: www.diabetes.org      Eating healthy: www.cdc.gov/healthyweight      CDC home safety checklist: www.cdc.gov/steadi/patient.html      Agency on Aging: www.goea.[place].gov      Alcoholics anonymous (AA): www.aa.org      Physical Activity: www.danyel.nih.gov/yn8htsy      Tobacco use: www.quitwithusla.org

## 2024-08-23 ENCOUNTER — OFFICE VISIT (OUTPATIENT)
Dept: OTOLARYNGOLOGY | Facility: CLINIC | Age: 79
End: 2024-08-23
Payer: COMMERCIAL

## 2024-08-23 VITALS — WEIGHT: 108 LBS | BODY MASS INDEX: 17.99 KG/M2 | HEIGHT: 65 IN

## 2024-08-23 DIAGNOSIS — H61.23 HEARING LOSS OF BOTH EARS DUE TO CERUMEN IMPACTION: Primary | ICD-10-CM

## 2024-08-23 PROCEDURE — 99999 PR PBB SHADOW E&M-EST. PATIENT-LVL III: CPT | Mod: PBBFAC,,, | Performed by: STUDENT IN AN ORGANIZED HEALTH CARE EDUCATION/TRAINING PROGRAM

## 2024-08-23 NOTE — PROGRESS NOTES
Chief complaint:   Chief Complaint   Patient presents with    Ear Fullness     Pt has clogged ears  x 1 weeks   Both ears are itchy           Referring Provider:  No referring provider defined for this encounter.    History of Present Illness:     Ms. Vegas is a 79 y.o. female presenting for evaluation of itchy ears, fullness, decreased hearing. Onset of this chief complaint was about a few months ago.  Additional symptoms that also have been associated are muffled hearing. The patient has tried nothing with out relief.  The patient denies tinnitus, vertigo, drainage.      History        Past Medical History:   Past Medical History:   Diagnosis Date    Allergy     Cataract     Gout     PVD (peripheral vascular disease) 4/28/2023    .          Past Surgical History:  Past Surgical History:   Procedure Laterality Date    CATARACT EXTRACTION Right 05/10/2018    HYSTERECTOMY      NEL/BSO    VARICOSE VEIN SURGERY     .         Medications: Medication list was reviewed. She  has a current medication list which includes the following prescription(s): amlodipine, benzonatate, cilostazol, diclofenac sodium, eliquis, metoprolol succinate, and pravastatin.         Allergies:   Review of patient's allergies indicates:   Allergen Reactions    Penicillins      Other reaction(s): Itching            Family history: family history includes Cervical cancer in her mother; Esophageal cancer in her mother; Glaucoma in her mother; Hypertension in her mother; Kidney disease in an other family member; Lupus in an other family member; No Known Problems in her father; Prostate cancer in her maternal uncle.         Social History          Alcohol use:  reports that she does not currently use alcohol.            Tobacco:  reports that she has been smoking cigarettes. She has quit using smokeless tobacco.         Please see the patient's intake form for full details of past medical history, past surgical history, family history, social history  and review of systems. ?This information was reviewed by me and noted.      Physical Examination     General: Well developed, well nourished, well hydrated. Verbal with a strong voice and not dysphonic.     Head/Face: Normocephalic, atraumatic. No scars or lesions. Facial musculature equal.     Eyes: No scleral icterus or conjunctival hemorrhage. EOMI. PERRLA.     Ears: after disimpaction     Right ear: No gross deformity. EAC is clear of debris and erythema. The TM is intact with a pneumatized middle ear. No signs of retraction, fluid or infection.      Left ear: No gross deformity. EAC is clear of debris and erythema. The TM is intact with a pneumatized middle ear. No signs of retraction, fluid or infection.     Hearing: grossly intact      Neurologic: Moving all extremities without gross abnormality.CN II-XII grossly intact. House-Brackmann 1/6. No signs of nystagmus.      Psych: Alert and oriented to person, place, and time with an appropriate mood and affect.     Data review:    Review of records:      I reviewed records from the referring provider's office visits.  These describe the history, workup, and/or treatment of this problem thus far.    Procedure: ear microscopy with removal of cerumen    Description: The patient was in agreement with the examination and debridement of the ears. Removal of the cerumen required use of an operating microscope and multiple micro-instruments.     With the patient in the supine position, we used the operating microscope to examine both ears with the appropriate sized ear speculum.  A variety of sterile, micro-instruments were utilized to remove the cerumen atraumatically.  I performed the procedure which required a significant amount of time and effort. The tympanic membrane was then well visualized.  The patient tolerated the procedure well and there were no complications.    Findings:   Right ear had significant wax, the EAC was normal, and the tympanic membrane was  intact with no evidence of middle ear fluid.    Left ear had significant wax, the EAC was normal, and the tympanic membrane was intact with no evidence of middle ear fluid.         Assessment/Plan:      1. Hearing loss of both ears due to cerumen impaction             Cerumen Impaction  We discussed preventative measures and treatment options.  Q-tips must be avoided, instead the ears can be cleaned with OTC ear rinses (or mineral oil).  If the cerumen impacts the ear canal and causes hearing loss or infection she needs to follow-up in the clinic for treatment and cleaning. Due to severity, I do recommend repeat cleaning in 6 months.  Marquise Merrill MD  [facility]   [facility] - [address]  [place] [address]  P: [phone]  F: [phone]

## 2024-09-16 ENCOUNTER — TELEPHONE (OUTPATIENT)
Dept: FAMILY MEDICINE | Facility: CLINIC | Age: 79
End: 2024-09-16
Payer: COMMERCIAL

## 2024-09-16 NOTE — TELEPHONE ENCOUNTER
Addendum---------Ms. Vegas mobility limitation cannot be sufficiently resolved by the use of a cane.  Her functional mobility deficit can be sufficiently resolved with the use of a rollator/walker. Her mobility limitation significantly impairs her ability to participate in one or more activities of daily living. The use of a rollator/walker will significantly improve her ability to participate in MRADLS and the patient will use it on a regular basis in her home.

## 2024-09-17 ENCOUNTER — TELEPHONE (OUTPATIENT)
Dept: FAMILY MEDICINE | Facility: CLINIC | Age: 79
End: 2024-09-17
Payer: COMMERCIAL

## 2024-09-23 ENCOUNTER — LAB VISIT (OUTPATIENT)
Dept: LAB | Facility: HOSPITAL | Age: 79
End: 2024-09-23
Attending: INTERNAL MEDICINE
Payer: COMMERCIAL

## 2024-09-23 ENCOUNTER — OFFICE VISIT (OUTPATIENT)
Dept: FAMILY MEDICINE | Facility: CLINIC | Age: 79
End: 2024-09-23
Payer: COMMERCIAL

## 2024-09-23 VITALS
OXYGEN SATURATION: 97 % | HEART RATE: 65 BPM | WEIGHT: 104.25 LBS | TEMPERATURE: 98 F | RESPIRATION RATE: 18 BRPM | DIASTOLIC BLOOD PRESSURE: 72 MMHG | BODY MASS INDEX: 17.37 KG/M2 | HEIGHT: 65 IN | SYSTOLIC BLOOD PRESSURE: 120 MMHG

## 2024-09-23 DIAGNOSIS — D64.9 NORMOCYTIC ANEMIA: ICD-10-CM

## 2024-09-23 DIAGNOSIS — Z79.01 ANTICOAGULATED: ICD-10-CM

## 2024-09-23 DIAGNOSIS — I73.9 PAD (PERIPHERAL ARTERY DISEASE): ICD-10-CM

## 2024-09-23 DIAGNOSIS — R53.81 DEBILITY: ICD-10-CM

## 2024-09-23 DIAGNOSIS — Z12.31 ENCOUNTER FOR SCREENING MAMMOGRAM FOR BREAST CANCER: ICD-10-CM

## 2024-09-23 DIAGNOSIS — M17.0 PRIMARY OSTEOARTHRITIS OF BOTH KNEES: ICD-10-CM

## 2024-09-23 DIAGNOSIS — I10 PRIMARY HYPERTENSION: ICD-10-CM

## 2024-09-23 DIAGNOSIS — E78.5 DYSLIPIDEMIA: ICD-10-CM

## 2024-09-23 DIAGNOSIS — I48.91 ATRIAL FIBRILLATION, UNSPECIFIED TYPE: Primary | ICD-10-CM

## 2024-09-23 LAB
ALBUMIN SERPL BCP-MCNC: 3.6 G/DL (ref 3.5–5.2)
ALP SERPL-CCNC: 74 U/L (ref 55–135)
ALT SERPL W/O P-5'-P-CCNC: 7 U/L (ref 10–44)
ANION GAP SERPL CALC-SCNC: 11 MMOL/L (ref 8–16)
AST SERPL-CCNC: 16 U/L (ref 10–40)
BASOPHILS # BLD AUTO: 0.04 K/UL (ref 0–0.2)
BASOPHILS NFR BLD: 0.5 % (ref 0–1.9)
BILIRUB SERPL-MCNC: 0.5 MG/DL (ref 0.1–1)
BUN SERPL-MCNC: 18 MG/DL (ref 8–23)
CALCIUM SERPL-MCNC: 9.6 MG/DL (ref 8.7–10.5)
CHLORIDE SERPL-SCNC: 108 MMOL/L (ref 95–110)
CHOLEST SERPL-MCNC: 153 MG/DL (ref 120–199)
CHOLEST/HDLC SERPL: 2.6 {RATIO} (ref 2–5)
CO2 SERPL-SCNC: 24 MMOL/L (ref 23–29)
CREAT SERPL-MCNC: 1.2 MG/DL (ref 0.5–1.4)
DIFFERENTIAL METHOD BLD: ABNORMAL
EOSINOPHIL # BLD AUTO: 0.2 K/UL (ref 0–0.5)
EOSINOPHIL NFR BLD: 2.6 % (ref 0–8)
ERYTHROCYTE [DISTWIDTH] IN BLOOD BY AUTOMATED COUNT: 15.3 % (ref 11.5–14.5)
EST. GFR  (NO RACE VARIABLE): 45.5 ML/MIN/1.73 M^2
GLUCOSE SERPL-MCNC: 84 MG/DL (ref 70–110)
HCT VFR BLD AUTO: 38.8 % (ref 37–48.5)
HDLC SERPL-MCNC: 58 MG/DL (ref 40–75)
HDLC SERPL: 37.9 % (ref 20–50)
HGB BLD-MCNC: 11.7 G/DL (ref 12–16)
IMM GRANULOCYTES # BLD AUTO: 0.03 K/UL (ref 0–0.04)
IMM GRANULOCYTES NFR BLD AUTO: 0.3 % (ref 0–0.5)
LDLC SERPL CALC-MCNC: 83.8 MG/DL (ref 63–159)
LYMPHOCYTES # BLD AUTO: 1.5 K/UL (ref 1–4.8)
LYMPHOCYTES NFR BLD: 17.3 % (ref 18–48)
MCH RBC QN AUTO: 28.8 PG (ref 27–31)
MCHC RBC AUTO-ENTMCNC: 30.2 G/DL (ref 32–36)
MCV RBC AUTO: 96 FL (ref 82–98)
MONOCYTES # BLD AUTO: 0.6 K/UL (ref 0.3–1)
MONOCYTES NFR BLD: 6.5 % (ref 4–15)
NEUTROPHILS # BLD AUTO: 6.4 K/UL (ref 1.8–7.7)
NEUTROPHILS NFR BLD: 72.8 % (ref 38–73)
NONHDLC SERPL-MCNC: 95 MG/DL
NRBC BLD-RTO: 0 /100 WBC
PLATELET # BLD AUTO: 248 K/UL (ref 150–450)
PMV BLD AUTO: 11.7 FL (ref 9.2–12.9)
POTASSIUM SERPL-SCNC: 4.3 MMOL/L (ref 3.5–5.1)
PROT SERPL-MCNC: 7.3 G/DL (ref 6–8.4)
RBC # BLD AUTO: 4.06 M/UL (ref 4–5.4)
SODIUM SERPL-SCNC: 143 MMOL/L (ref 136–145)
TRIGL SERPL-MCNC: 56 MG/DL (ref 30–150)
TSH SERPL DL<=0.005 MIU/L-ACNC: 0.75 UIU/ML (ref 0.4–4)
WBC # BLD AUTO: 8.79 K/UL (ref 3.9–12.7)

## 2024-09-23 PROCEDURE — 80061 LIPID PANEL: CPT | Performed by: INTERNAL MEDICINE

## 2024-09-23 PROCEDURE — 3074F SYST BP LT 130 MM HG: CPT | Mod: CPTII,S$GLB,, | Performed by: INTERNAL MEDICINE

## 2024-09-23 PROCEDURE — G2211 COMPLEX E/M VISIT ADD ON: HCPCS | Mod: S$GLB,,, | Performed by: INTERNAL MEDICINE

## 2024-09-23 PROCEDURE — 85025 COMPLETE CBC W/AUTO DIFF WBC: CPT | Performed by: INTERNAL MEDICINE

## 2024-09-23 PROCEDURE — 36415 COLL VENOUS BLD VENIPUNCTURE: CPT | Mod: PO | Performed by: INTERNAL MEDICINE

## 2024-09-23 PROCEDURE — 80053 COMPREHEN METABOLIC PANEL: CPT | Performed by: INTERNAL MEDICINE

## 2024-09-23 PROCEDURE — 84443 ASSAY THYROID STIM HORMONE: CPT | Performed by: INTERNAL MEDICINE

## 2024-09-23 PROCEDURE — 99999 PR PBB SHADOW E&M-EST. PATIENT-LVL V: CPT | Mod: PBBFAC,,, | Performed by: INTERNAL MEDICINE

## 2024-09-23 PROCEDURE — 3078F DIAST BP <80 MM HG: CPT | Mod: CPTII,S$GLB,, | Performed by: INTERNAL MEDICINE

## 2024-09-23 PROCEDURE — 99214 OFFICE O/P EST MOD 30 MIN: CPT | Mod: S$GLB,,, | Performed by: INTERNAL MEDICINE

## 2024-09-23 PROCEDURE — 1101F PT FALLS ASSESS-DOCD LE1/YR: CPT | Mod: CPTII,S$GLB,, | Performed by: INTERNAL MEDICINE

## 2024-09-23 PROCEDURE — 1159F MED LIST DOCD IN RCRD: CPT | Mod: CPTII,S$GLB,, | Performed by: INTERNAL MEDICINE

## 2024-09-23 PROCEDURE — 3288F FALL RISK ASSESSMENT DOCD: CPT | Mod: CPTII,S$GLB,, | Performed by: INTERNAL MEDICINE

## 2024-09-23 PROCEDURE — 1125F AMNT PAIN NOTED PAIN PRSNT: CPT | Mod: CPTII,S$GLB,, | Performed by: INTERNAL MEDICINE

## 2024-09-23 NOTE — PROGRESS NOTES
Subjective:       Patient ID: Ezequiel Vegas is a 79 y.o. female.    Chief Complaint: Orders, Hypertension, Hyperlipidemia, Anemia, Atrial Fibrillation, Anticoagulation, and Peripheral Vascular Disease    Hypertension  Pertinent negatives include no chest pain, headaches, neck pain, palpitations or shortness of breath.   Hyperlipidemia  Associated symptoms include myalgias. Pertinent negatives include no chest pain or shortness of breath.   Anemia  There has been no abdominal pain, bruising/bleeding easily, confusion, fever, light-headedness, pallor or palpitations.   Atrial Fibrillation  Symptoms include weakness. Symptoms are negative for chest pain, dizziness, palpitations and shortness of breath. Past medical history includes atrial fibrillation and hyperlipidemia.     Past Medical History:   Diagnosis Date    Allergy     Cataract     Gout     PVD (peripheral vascular disease) 4/28/2023     Past Surgical History:   Procedure Laterality Date    CATARACT EXTRACTION Right 05/10/2018    EYE SURGERY      HYSTERECTOMY      NEL/BSO    VARICOSE VEIN SURGERY       Family History   Problem Relation Name Age of Onset    Cervical cancer Mother      Glaucoma Mother      Esophageal cancer Mother      Hypertension Mother      No Known Problems Father      Prostate cancer Maternal Uncle      Lupus Other Niece     Kidney disease Other Niece     Stroke Neg Hx      Diabetes Neg Hx       Social History     Socioeconomic History    Marital status:     Number of children: 0   Tobacco Use    Smoking status: Former     Current packs/day: 0.50     Types: Cigarettes    Smokeless tobacco: Former    Tobacco comments:     occasionally   Substance and Sexual Activity    Alcohol use: Not Currently    Drug use: No    Sexual activity: Never     Social Determinants of Health     Financial Resource Strain: Low Risk  (7/21/2024)    Overall Financial Resource Strain (CARDIA)     Difficulty of Paying Living Expenses: Not hard at all   Food  Insecurity: No Food Insecurity (7/21/2024)    Hunger Vital Sign     Worried About Running Out of Food in the Last Year: Never true     Ran Out of Food in the Last Year: Never true   Transportation Needs: No Transportation Needs (3/31/2023)    Received from [facility] Subsidiaries and Affiliates    PRAPARE - Transportation     Lack of Transportation (Medical): No     Lack of Transportation (Non-Medical): No   Physical Activity: Unknown (7/21/2024)    Exercise Vital Sign     Days of Exercise per Week: 2 days   Stress: No Stress Concern Present (7/21/2024)    [employer] - Occupational Stress Questionnaire     Feeling of Stress : Not at all   Housing Stability: Unknown (7/21/2024)    Housing Stability Vital Sign     Unable to Pay for Housing in the Last Year: No     Review of Systems   Constitutional:  Negative for activity change, appetite change, chills, diaphoresis, fatigue, fever and unexpected weight change.   HENT:  Negative for congestion, drooling, ear discharge, ear pain, facial swelling, hearing loss, mouth sores, nosebleeds, postnasal drip, rhinorrhea, sinus pressure, sneezing, sore throat, tinnitus, trouble swallowing and voice change.    Eyes:  Negative for photophobia, redness and visual disturbance.   Respiratory:  Negative for apnea, cough, choking, chest tightness, shortness of breath and wheezing.    Cardiovascular:  Negative for chest pain and palpitations.   Gastrointestinal:  Negative for abdominal distention, abdominal pain, blood in stool, constipation, diarrhea, nausea and vomiting.   Endocrine: Negative for cold intolerance, heat intolerance, polydipsia, polyphagia and polyuria.   Genitourinary:  Negative for decreased urine volume, difficulty urinating, dysuria, flank pain, frequency, genital sores, hematuria, pelvic pain and urgency.   Musculoskeletal:  Positive for arthralgias, gait problem and myalgias. Negative for  back pain, joint swelling, neck pain and neck stiffness.   Skin:  Negative for color change, pallor, rash and wound.   Allergic/Immunologic: Negative for food allergies and immunocompromised state.   Neurological:  Positive for weakness. Negative for dizziness, tremors, seizures, syncope, speech difficulty, light-headedness, numbness and headaches.   Hematological:  Negative for adenopathy. Does not bruise/bleed easily.   Psychiatric/Behavioral:  Negative for agitation, behavioral problems, confusion, decreased concentration, dysphoric mood, hallucinations, self-injury, sleep disturbance and suicidal ideas. The patient is not nervous/anxious and is not hyperactive.    All other systems reviewed and are negative.      Objective:      Physical Exam  Vitals and nursing note reviewed.   Constitutional:       General: She is not in acute distress.     Appearance: Normal appearance. She is well-developed. She is not diaphoretic.   HENT:      Head: Normocephalic and atraumatic.      Mouth/Throat:      Pharynx: No oropharyngeal exudate.   Eyes:      General: No scleral icterus.  Neck:      Thyroid: No thyromegaly.      Vascular: No carotid bruit or JVD.      Trachea: No tracheal deviation.   Cardiovascular:      Rate and Rhythm: Normal rate and regular rhythm.      Heart sounds: Normal heart sounds.   Pulmonary:      Effort: Pulmonary effort is normal. No respiratory distress.      Breath sounds: Normal breath sounds. No wheezing or rales.   Chest:      Chest wall: No tenderness.   Abdominal:      General: Bowel sounds are normal. There is no distension.      Palpations: Abdomen is soft.      Tenderness: There is no abdominal tenderness. There is no guarding or rebound.   Musculoskeletal:         General: No tenderness. Normal range of motion.      Cervical back: Normal range of motion and neck supple.   Lymphadenopathy:      Cervical: No cervical adenopathy.   Skin:     General: Skin is warm and dry.      Coloration: Skin  is not pale.      Findings: No erythema or rash.   Neurological:      Mental Status: She is alert and oriented to person, place, and time.   Psychiatric:         Behavior: Behavior normal.         Thought Content: Thought content normal.         Judgment: Judgment normal.         CMP  Sodium   Date Value Ref Range Status   05/18/2023 141 136 - 145 mmol/L Final     Potassium   Date Value Ref Range Status   05/18/2023 4.8 3.5 - 5.1 mmol/L Final     Chloride   Date Value Ref Range Status   05/18/2023 109 95 - 110 mmol/L Final     CO2   Date Value Ref Range Status   05/18/2023 22 (L) 23 - 29 mmol/L Final     Glucose   Date Value Ref Range Status   05/18/2023 65 (L) 70 - 110 mg/dL Final     BUN   Date Value Ref Range Status   05/18/2023 16 8 - 23 mg/dL Final     Creatinine   Date Value Ref Range Status   05/18/2023 1.1 0.5 - 1.4 mg/dL Final     Calcium   Date Value Ref Range Status   05/18/2023 9.7 8.7 - 10.5 mg/dL Final     Total Protein   Date Value Ref Range Status   05/18/2023 7.9 6.0 - 8.4 g/dL Final     Albumin   Date Value Ref Range Status   05/18/2023 3.5 3.5 - 5.2 g/dL Final     Total Bilirubin   Date Value Ref Range Status   05/18/2023 0.4 0.1 - 1.0 mg/dL Final     Comment:     For infants and newborns, interpretation of results should be based  on gestational age, weight and in agreement with clinical  observations.    Premature Infant recommended reference ranges:  Up to 24 hours.............<8.0 mg/dL  Up to 48 hours............<12.0 mg/dL  3-5 days..................<15.0 mg/dL  6-29 days.................<15.0 mg/dL       Alkaline Phosphatase   Date Value Ref Range Status   05/18/2023 69 55 - 135 U/L Final     AST   Date Value Ref Range Status   05/18/2023 25 10 - 40 U/L Final     ALT   Date Value Ref Range Status   05/18/2023 18 10 - 44 U/L Final     Anion Gap   Date Value Ref Range Status   05/18/2023 10 8 - 16 mmol/L Final     eGFR if    Date Value Ref Range Status   02/08/2021 56.0 (A)  >60 mL/min/1.73 m^2 Final     eGFR    Date Value Ref Range Status   04/04/2023 82 mL/min/1.73mSq Final     Comment:     In accordance with NKF-ASN Task Force recommendation, calculation based on the Chronic Kidney Disease Epidemiology Collaboration (CKD-EPI) equation without adjustment for race. eGFR adjusted for gender and age and calculated in ml/min/1.73mSquared. eGFR cannot be calculated if patient is under 18 years of age.     Reference Range:   >= 60 ml/min/1.73mSquared.     eGFR if non    Date Value Ref Range Status   02/08/2021 48.5 (A) >60 mL/min/1.73 m^2 Final     Comment:     Calculation used to obtain the estimated glomerular filtration  rate (eGFR) is the CKD-EPI equation.        Lab Results   Component Value Date    WBC 7.48 06/07/2023    HGB 11.4 (L) 06/07/2023    HCT 35.9 (L) 06/07/2023    MCV 93 06/07/2023     06/07/2023     Lab Results   Component Value Date    CHOL 211 (H) 10/13/2022     Lab Results   Component Value Date    HDL 71 10/13/2022     Lab Results   Component Value Date    LDLCALC 125.2 10/13/2022     Lab Results   Component Value Date    TRIG 74 10/13/2022     Lab Results   Component Value Date    CHOLHDL 33.6 10/13/2022     Lab Results   Component Value Date    TSH 0.669 10/13/2022     Lab Results   Component Value Date    HGBA1C 5.2 12/11/2020     Assessment:       1. Atrial fibrillation, unspecified type    2. Anticoagulated    3. Debility    4. Dyslipidemia    5. Primary hypertension    6. Normocytic anemia    7. PAD (peripheral artery disease)    8. Primary osteoarthritis of both knees    9. Encounter for screening mammogram for breast cancer        Plan:   Atrial fibrillation, unspecified type---------------sees cards------------------------    Anticoagulated    Debility    Dyslipidemia  -     Lipid Panel; Future; Expected date: 09/23/2024    Primary hypertension  -     Comprehensive Metabolic Panel; Future; Expected date: 09/23/2024  -      CBC Auto Differential; Future; Expected date: 09/23/2024  -     TSH; Future; Expected date: 09/23/2024    Normocytic anemia    PAD (peripheral artery disease)  -     WALKER FOR HOME USE    Primary osteoarthritis of both knees  -     WALKER FOR HOME USE    Encounter for screening mammogram for breast cancer  -     Mammo Digital Screening Bilat w/ Adria; Future; Expected date: 09/23/2024      Stable-------------continue meds-------------as above---------------------the pt.'s mobility limitation cannot be sufficiently resolved by the use of a cane. Pt.'s functional mobility deficit can be sufficiently resolved with the us of a rollator walker.    Pt.'s mobility limitation significantly impairs her ability to participate in one or more activities of daily living. The use of a rollator walker will significantly improve pt.'s ability in MRADLS and the patient will use it on a regular basis in the home.,---------------------------------------f/u 3 months-

## 2024-09-27 ENCOUNTER — TELEPHONE (OUTPATIENT)
Dept: FAMILY MEDICINE | Facility: CLINIC | Age: 79
End: 2024-09-27
Payer: COMMERCIAL

## 2024-09-27 NOTE — TELEPHONE ENCOUNTER
----- Message from Criselda Welch sent at 9/27/2024  2:25 PM CDT -----  Contact: [facility]  .Type: Orders Request    What orders/ testing are being requested? Walker     Is there a future appointment scheduled for the patient with PCP? Yes     When? 01/30    Would you prefer a response via [employer]? Call back     Comments: Ammon with [facility] states pt is requesting an walker , please call back at [phone]

## 2024-09-30 ENCOUNTER — HOSPITAL ENCOUNTER (OUTPATIENT)
Dept: RADIOLOGY | Facility: HOSPITAL | Age: 79
Discharge: HOME | End: 2024-09-30
Attending: INTERNAL MEDICINE
Payer: COMMERCIAL

## 2024-09-30 DIAGNOSIS — Z12.31 ENCOUNTER FOR SCREENING MAMMOGRAM FOR BREAST CANCER: ICD-10-CM

## 2024-09-30 PROCEDURE — 77063 BREAST TOMOSYNTHESIS BI: CPT | Mod: 26,,, | Performed by: RADIOLOGY

## 2024-09-30 PROCEDURE — 77067 SCR MAMMO BI INCL CAD: CPT | Mod: 26,,, | Performed by: RADIOLOGY

## 2024-09-30 PROCEDURE — 77067 SCR MAMMO BI INCL CAD: CPT | Mod: TC,PN

## 2024-11-06 ENCOUNTER — TELEPHONE (OUTPATIENT)
Dept: CARDIOLOGY | Facility: CLINIC | Age: 79
End: 2024-11-06
Payer: MEDICARE

## 2024-11-06 NOTE — TELEPHONE ENCOUNTER
----- Message from [employer] sent at 11/5/2024  5:53 PM CST -----  Type: General Call Back         Name of Caller:pt  Would the patient rather a call back or a response via [facility]? call  Best Call Back Number:[phone]   Additional Information: Pt cancelled 11/06 appt due to not being aware it was on a waiting list. Pt would like appt on original date 11/20 at 5:20 pm with EKG. Pt did not agree to have appt on waiting list. Please call pt with further info and assistance. Thank you.

## 2024-11-13 ENCOUNTER — OFFICE VISIT (OUTPATIENT)
Dept: CARDIOLOGY | Facility: CLINIC | Age: 79
End: 2024-11-13
Payer: MEDICARE

## 2024-11-13 ENCOUNTER — TELEPHONE (OUTPATIENT)
Dept: PODIATRY | Facility: CLINIC | Age: 79
End: 2024-11-13
Payer: MEDICARE

## 2024-11-13 ENCOUNTER — HOSPITAL ENCOUNTER (OUTPATIENT)
Dept: CARDIOLOGY | Facility: HOSPITAL | Age: 79
Discharge: HOME | End: 2024-11-13
Attending: INTERNAL MEDICINE
Payer: MEDICARE

## 2024-11-13 VITALS
WEIGHT: 112 LBS | DIASTOLIC BLOOD PRESSURE: 80 MMHG | BODY MASS INDEX: 18.64 KG/M2 | SYSTOLIC BLOOD PRESSURE: 120 MMHG | HEART RATE: 71 BPM | OXYGEN SATURATION: 98 %

## 2024-11-13 DIAGNOSIS — I48.91 ATRIAL FIBRILLATION, UNSPECIFIED TYPE: ICD-10-CM

## 2024-11-13 DIAGNOSIS — I73.9 PVD (PERIPHERAL VASCULAR DISEASE): Primary | ICD-10-CM

## 2024-11-13 DIAGNOSIS — I73.9 PAD (PERIPHERAL ARTERY DISEASE): ICD-10-CM

## 2024-11-13 DIAGNOSIS — I10 PRIMARY HYPERTENSION: ICD-10-CM

## 2024-11-13 DIAGNOSIS — L97.512 SKIN ULCER OF TOE OF RIGHT FOOT WITH FAT LAYER EXPOSED: ICD-10-CM

## 2024-11-13 DIAGNOSIS — Z72.0 TOBACCO USE: ICD-10-CM

## 2024-11-13 DIAGNOSIS — E78.5 DYSLIPIDEMIA: ICD-10-CM

## 2024-11-13 DIAGNOSIS — I48.0 PAF (PAROXYSMAL ATRIAL FIBRILLATION): ICD-10-CM

## 2024-11-13 DIAGNOSIS — N18.31 STAGE 3A CHRONIC KIDNEY DISEASE: ICD-10-CM

## 2024-11-13 LAB
OHS QRS DURATION: 72 MS
OHS QTC CALCULATION: 428 MS

## 2024-11-13 PROCEDURE — 3074F SYST BP LT 130 MM HG: CPT | Mod: CPTII,S$GLB,, | Performed by: INTERNAL MEDICINE

## 2024-11-13 PROCEDURE — 93005 ELECTROCARDIOGRAM TRACING: CPT

## 2024-11-13 PROCEDURE — 3288F FALL RISK ASSESSMENT DOCD: CPT | Mod: CPTII,S$GLB,, | Performed by: INTERNAL MEDICINE

## 2024-11-13 PROCEDURE — 1159F MED LIST DOCD IN RCRD: CPT | Mod: CPTII,S$GLB,, | Performed by: INTERNAL MEDICINE

## 2024-11-13 PROCEDURE — 1101F PT FALLS ASSESS-DOCD LE1/YR: CPT | Mod: CPTII,S$GLB,, | Performed by: INTERNAL MEDICINE

## 2024-11-13 PROCEDURE — 99214 OFFICE O/P EST MOD 30 MIN: CPT | Mod: S$GLB,,, | Performed by: INTERNAL MEDICINE

## 2024-11-13 PROCEDURE — 1125F AMNT PAIN NOTED PAIN PRSNT: CPT | Mod: CPTII,S$GLB,, | Performed by: INTERNAL MEDICINE

## 2024-11-13 PROCEDURE — 99999 PR PBB SHADOW E&M-EST. PATIENT-LVL III: CPT | Mod: PBBFAC,,, | Performed by: INTERNAL MEDICINE

## 2024-11-13 PROCEDURE — 93010 ELECTROCARDIOGRAM REPORT: CPT | Mod: ,,, | Performed by: INTERNAL MEDICINE

## 2024-11-13 PROCEDURE — 3079F DIAST BP 80-89 MM HG: CPT | Mod: CPTII,S$GLB,, | Performed by: INTERNAL MEDICINE

## 2024-11-13 RX ORDER — PRAVASTATIN SODIUM 40 MG/1
40 TABLET ORAL DAILY
Qty: 90 TABLET | Refills: 3 | Status: SHIPPED | OUTPATIENT
Start: 2024-11-13 | End: 2025-11-13

## 2024-11-13 RX ORDER — APIXABAN 2.5 MG/1
2.5 TABLET, FILM COATED ORAL 2 TIMES DAILY
Qty: 180 TABLET | Refills: 1 | Status: SHIPPED | OUTPATIENT
Start: 2024-11-13

## 2024-11-13 RX ORDER — METOPROLOL SUCCINATE 50 MG/1
50 TABLET, EXTENDED RELEASE ORAL DAILY
Qty: 90 TABLET | Refills: 3 | Status: SHIPPED | OUTPATIENT
Start: 2024-11-13

## 2024-11-13 RX ORDER — CILOSTAZOL 50 MG/1
50 TABLET ORAL 2 TIMES DAILY
Qty: 180 TABLET | Refills: 3 | Status: SHIPPED | OUTPATIENT
Start: 2024-11-13 | End: 2025-11-13

## 2024-11-13 RX ORDER — AMLODIPINE BESYLATE 5 MG/1
5 TABLET ORAL DAILY
Qty: 90 TABLET | Refills: 3 | Status: SHIPPED | OUTPATIENT
Start: 2024-11-13

## 2024-11-13 NOTE — TELEPHONE ENCOUNTER
LVM requesting callback in regards to scheduling an appointment.,  ----- Message from Lucian Ochoa sent at 11/13/2024  4:30 PM CST -----  Contact: Leyla (sister) [phone]    ----- Message -----  From: Lucía Acharya  Sent: 11/13/2024   3:43 PM CST  To: [facility]an Garrison Staff    Would like to receive medical advice.    Would they like a call back or a response via [facility]:  call back    Additional information:  Pt is requesting a sooner appt with above provider for nail care to no avail.

## 2024-11-13 NOTE — TELEPHONE ENCOUNTER
Scheduled pt with Dr Spears at [facility] for 11/14/24 at 11:00        ----- Message from Leda sent at 11/13/2024  4:42 PM CST -----  Contact: Ezequiel  Type:  Patient Returning Call    Who Called: Ezequiel  Who Left Message for Patient: Nurse Anders   Does the patient know what this is regarding?: Unknown  Would the patient rather a call back or a response via [facility]? Call back  Best Call Back Number:[phone]  Additional Information:     Thanks  Sl

## 2024-11-14 ENCOUNTER — OFFICE VISIT (OUTPATIENT)
Dept: PODIATRY | Facility: CLINIC | Age: 79
End: 2024-11-14
Payer: MEDICARE

## 2024-11-14 DIAGNOSIS — L97.511 SKIN ULCER OF SECOND TOE, RIGHT, LIMITED TO BREAKDOWN OF SKIN: ICD-10-CM

## 2024-11-14 DIAGNOSIS — B35.1 DERMATOPHYTOSIS OF NAIL: ICD-10-CM

## 2024-11-14 DIAGNOSIS — I73.9 ARTERIAL INSUFFICIENCY OF LOWER EXTREMITY: Primary | ICD-10-CM

## 2024-11-14 PROCEDURE — 99999 PR PBB SHADOW E&M-EST. PATIENT-LVL III: CPT | Mod: PBBFAC,,, | Performed by: PODIATRIST

## 2024-11-14 NOTE — PROGRESS NOTES
Subjective:       Patient ID: Ezequiel Vegas is a 79 y.o. female.    Chief Complaint: Routine Foot Care (Patient is non diabetic and continues on eliquis. She was last seen on 11.13.24 by Trell Hanks. She complains of 5/10 pain at present to nails. Nails are thickened and long and curving. )      HPI: Patient presents to the office today with the chief complaint of elongated, thickened and dystrophic nail plates to the B/L foot. Patient also complains of painful ulcer to the medial aspect of the right 2nd toe.  Continues to follow with Dr. Cai with last appointment on 11/13/2024. This patient does have a PMHx. of Peripheral Angiopathy and Peripheral Neuropathy. Patient does follow with Primary Care for management of comorbid states. This patient's PMD is Uri Matthews MD.     Review of patient's allergies indicates:   Allergen Reactions    Penicillins      Other reaction(s): Itching       Past Medical History:   Diagnosis Date    Allergy     Cataract     Gout     PVD (peripheral vascular disease) 04/28/2023    Skin ulcer of toe of right foot with fat layer exposed 11/13/2024       Family History   Problem Relation Name Age of Onset    Cervical cancer Mother      Glaucoma Mother      Esophageal cancer Mother      Hypertension Mother      No Known Problems Father      Prostate cancer Maternal Uncle      Lupus Other Niece     Kidney disease Other Niece     Stroke Neg Hx      Diabetes Neg Hx         Social History     Socioeconomic History    Marital status:     Number of children: 0   Tobacco Use    Smoking status: Former     Current packs/day: 0.50     Types: Cigarettes    Smokeless tobacco: Former    Tobacco comments:     occasionally   Substance and Sexual Activity    Alcohol use: Not Currently    Drug use: No    Sexual activity: Never     Social Drivers of Health     Financial Resource Strain: Low Risk  (7/21/2024)    Overall Financial Resource Strain (CARDIA)     Difficulty of Paying Living  Expenses: Not hard at all   Food Insecurity: No Food Insecurity (7/21/2024)    Hunger Vital Sign     Worried About Running Out of Food in the Last Year: Never true     Ran Out of Food in the Last Year: Never true   Transportation Needs: No Transportation Needs (3/31/2023)    Received from [facility] and Its Subsidiaries and Affiliates    PRAPARE - Transportation     Lack of Transportation (Medical): No     Lack of Transportation (Non-Medical): No   Physical Activity: Unknown (7/21/2024)    Exercise Vital Sign     Days of Exercise per Week: 2 days   Stress: No Stress Concern Present (7/21/2024)    [employer] - Occupational Stress Questionnaire     Feeling of Stress : Not at all   Housing Stability: Unknown (7/21/2024)    Housing Stability Vital Sign     Unable to Pay for Housing in the Last Year: No       Past Surgical History:   Procedure Laterality Date    CATARACT EXTRACTION Right 05/10/2018    EYE SURGERY      HYSTERECTOMY      NEL/BSO    OOPHORECTOMY      VARICOSE VEIN SURGERY         Review of Systems       Objective:   There were no vitals taken for this visit.    Physical Exam  LOWER EXTREMITY PHYSICAL EXAMINATION    VASCULAR:  The right dorsalis pedis pulse 2/4 and the right posterior tibial pulse 1/4.  The left dorsalis pedis pulse 2/4 and posterior tibial pulse on the left is 1/4.  Capillary refill is intact.  Pedal hair growth decreased.     NEUROLOGY:  Protective sensation is intact with Granger Mick monofilament. Proprioception is intact. Intact sensation to light touch.  Vibratory sensation is diminished to the 1st metatarsal phalangeal joint.     DERMATOLOGY:  Skin is supple, moist, intact.  There is a superficial skin ulceration present to the medial aspect of the 2nd toe.  No radiating erythema.  No edema.  No signs of drainage.  The R1, 2, 5 and left L1,2, 5 are thickened, discolored dystrophic.  There is subungual debris.   Nail plates have area of dark discoloration.  The remaining nails 3-4 on the right foot and the left foot are elongated but of normal color, thickness, and texture.   There is no signs of ingrowing into the medial or lateral borders.  There is no evidence of wounds or skin breakdown.  No edema or erythema.  No obvious lacerations or fissuring.  Interdigital spaces are clean, dry, intact.  No rashes or scars appreciated.    ORTHOPEDIC:  No history of amputations.    Assessment:     1. Arterial insufficiency of lower extremity    2. Skin ulcer of second toe, right, limited to breakdown of skin    3. Dermatophytosis of nail        Plan:     Arterial insufficiency of lower extremity    Skin ulcer of second toe, right, limited to breakdown of skin    Dermatophytosis of nail      Thorough discussion is had with the patient today, concerning the diagnosis, its etiology, and the treatment algorithm at present.    Dystrophic nail plates, as outlined above (R#1,2,5  ; L#1,2,5 ), are sharply debrided with double action nail nipper, and/or with the assistance of a mechanical rotary chani, with removal of all offending nail and nail border(s), for reduction of pains. Nails are reduced in terms of length, width and girth with removal of subungual debris to facilitate pain free weight bearing and ambulation. The elongated nails as outlined in the objective portion of this note, were trimmed to appropriate length, with a double action nail nipper, for alleviation/reduction of pains as well. Follow up in approx. 3-4 months.    Recommend to continue to watch and monitor the progression of the ulceration present to the medial aspect of the right 2nd toe.  Recommend Betadine dressings, dry sterile gauze, toe spacer.  Will plan to follow-up patient in 2-3 weeks to evaluate progression.      Future Appointments   Date Time Provider Department Center   11/21/2024  2:15 PM CARDIOVASCULAR, [facility] [place] [facility]   11/21/2024  3:00 PM  CARDIOVASCULAR 2, [facility]   12/5/2024 11:15 AM Meli Spears DPM [facility]   1/30/2025 11:30 AM Uri Matthews MD LC [facility]   2/27/2025 11:00 AM Marquise Merrill MD HG ENT [facility]

## 2024-11-21 ENCOUNTER — HOSPITAL ENCOUNTER (OUTPATIENT)
Dept: CARDIOLOGY | Facility: HOSPITAL | Age: 79
Discharge: HOME | End: 2024-11-21
Attending: INTERNAL MEDICINE
Payer: MEDICARE

## 2024-11-21 VITALS
SYSTOLIC BLOOD PRESSURE: 161 MMHG | BODY MASS INDEX: 18.49 KG/M2 | DIASTOLIC BLOOD PRESSURE: 72 MMHG | HEIGHT: 65 IN | DIASTOLIC BLOOD PRESSURE: 72 MMHG | WEIGHT: 111 LBS | HEIGHT: 65 IN | BODY MASS INDEX: 18.49 KG/M2 | SYSTOLIC BLOOD PRESSURE: 161 MMHG | WEIGHT: 111 LBS

## 2024-11-21 DIAGNOSIS — I73.9 PAD (PERIPHERAL ARTERY DISEASE): ICD-10-CM

## 2024-11-21 DIAGNOSIS — L97.512 SKIN ULCER OF TOE OF RIGHT FOOT WITH FAT LAYER EXPOSED: ICD-10-CM

## 2024-11-21 PROCEDURE — 93922 UPR/L XTREMITY ART 2 LEVELS: CPT

## 2024-11-21 PROCEDURE — 93925 LOWER EXTREMITY STUDY: CPT

## 2024-11-23 LAB
LEFT ABI: 0.72
LEFT ANT TIBIAL SYS PSV: 29 CM/S
LEFT ARM BP: 158 MMHG
LEFT CFA PSV: 103 CM/S
LEFT DORSALIS PEDIS: 104 MMHG
LEFT PERONEAL SYS PSV: 27 CM/S
LEFT POPLITEAL PSV: 34 CM/S
LEFT POST TIBIAL SYS PSV: 22 CM/S
LEFT POSTERIOR TIBIAL: 116 MMHG
LEFT PROFUNDA SYS PSV: 205 CM/S
LEFT SUPER FEMORAL DIST SYS PSV: 57 CM/S
LEFT SUPER FEMORAL MID SYS PSV: 63 CM/S
LEFT SUPER FEMORAL OSTIAL SYS PSV: 438 CM/S
LEFT SUPER FEMORAL PROX SYS PSV: 76 CM/S
LEFT TBI: 0.33
LEFT TIB/PER TRUNK SYS PSV: 37 CM/S
LEFT TOE PRESSURE: 53 MMHG
OHS CV LEFT LOWER EXTREMITY ABI (NO CALC): 0.72
OHS CV RIGHT ABI LOWER EXTREMITY (NO CALC): 0.55
RIGHT ABI: 0.55
RIGHT ANT TIBIAL SYS PSV: 22 CM/S
RIGHT ARM BP: 161 MMHG
RIGHT CFA PSV: 119 CM/S
RIGHT DORSALIS PEDIS: 77 MMHG
RIGHT PERONEAL SYS PSV: 21 CM/S
RIGHT POPLITEAL PSV: 42 CM/S
RIGHT POST TIBIAL SYS PSV: 11 CM/S
RIGHT POSTERIOR TIBIAL: 89 MMHG
RIGHT PROFUNDA SYS PSV: 133 CM/S
RIGHT SUPER FEMORAL DIST SYS PSV: 66 CM/S
RIGHT SUPER FEMORAL MID SYS PSV: 259 CM/S
RIGHT SUPER FEMORAL OSTIAL SYS PSV: 85 CM/S
RIGHT SUPER FEMORAL PROX SYS PSV: 132 CM/S
RIGHT TBI: 0.34
RIGHT TIB/PER TRUNK SYS PSV: 23 CM/S
RIGHT TOE PRESSURE: 55 MMHG

## 2024-11-25 ENCOUNTER — TELEPHONE (OUTPATIENT)
Dept: CARDIOLOGY | Facility: CLINIC | Age: 79
End: 2024-11-25
Payer: MEDICARE

## 2024-11-25 NOTE — TELEPHONE ENCOUNTER
Called and advised sister the below and she voiced understanding. ----- Message from Kamini Alcala PA-C sent at 11/25/2024  7:48 AM CST -----  TAMMY is abnormal bilaterally, R worse than left.    Dr. Cai returns on Wednesday, will have him review. Please make sure she is taking Pletal and Eliquis.    Thanks

## 2024-11-27 ENCOUNTER — TELEPHONE (OUTPATIENT)
Dept: CARDIOLOGY | Facility: CLINIC | Age: 79
End: 2024-11-27
Payer: MEDICARE

## 2024-11-27 NOTE — TELEPHONE ENCOUNTER
Called sister Leyla and advised results and she voiced understanding.       ----- Message from Kamini Alcala PA-C sent at 11/27/2024 11:11 AM CST -----  Reviewed/discussed leg studies with Dr. Cai. Stable findings, will continue to monitor. Needs f/u with Dr. Cai in 3 months. If any worsening claudication or non-healing wounds, needs to f/u sooner.

## 2024-12-04 ENCOUNTER — TELEPHONE (OUTPATIENT)
Dept: FAMILY MEDICINE | Facility: CLINIC | Age: 79
End: 2024-12-04
Payer: MEDICARE

## 2024-12-04 NOTE — TELEPHONE ENCOUNTER
Spoke with patient informed Rx for Amlodipine and Metoprolol was sent to pharmacy on 11/13/24 for 90 day supply with 3 refills, advised to call pharmacy to request refill, voiced understanding.

## 2024-12-04 NOTE — TELEPHONE ENCOUNTER
----- Message from Paulina sent at 12/4/2024 10:30 AM CST -----  Contact: Ezequiel  Type:  RX Refill Request    Who Called: [facility]  Refill or New Rx:refill  RX Name and Strength:metoprolol succinate (TOPROL-XL) 50 MG 24 hr tablet  How is the patient currently taking it? (ex. 1XDay):1xday  Is this a 30 day or 90 day RX:90day  Preferred Pharmacy with phone number:  [facility] [address] [place] [address] [address] [facility]  [address] [facility] [address]  Phone: [phone] Fax: [phone]  Local or Mail Order:local  Ordering Provider:   Would the patient rather a call back or a response via [facility]?  Call back  Best Call Back Number:[phone]  Additional Information:   ype:  RX Refill Request    Who Called: Dolorius  Refill or New Rx:refill  RX Name and Strength:amLODIPine (NORVASC) 5 MG tablet  How is the patient currently taking it? (ex. 1XDay):1xday  Is this a 30 day or 90 day RX:90day  Preferred Pharmacy with phone number:  [facility] [address] [place] [address] [address] [facility]  [address] [facility] [address]  Phone: [phone] Fax: [phone]  Local or Mail Order:local  Ordering Provider:   Would the patient rather a call back or a response via [facility]?  Call back  Best Call Back Number:[phone]  Additional Information:    thanks   Am

## 2024-12-05 ENCOUNTER — OFFICE VISIT (OUTPATIENT)
Dept: PODIATRY | Facility: CLINIC | Age: 79
End: 2024-12-05
Payer: MEDICARE

## 2024-12-05 DIAGNOSIS — I73.9 PERIPHERAL ARTERIAL DISEASE: ICD-10-CM

## 2024-12-05 DIAGNOSIS — Z87.2 HISTORY OF FOOT ULCER: ICD-10-CM

## 2024-12-05 DIAGNOSIS — I73.9 ARTERIAL INSUFFICIENCY OF LOWER EXTREMITY: Primary | ICD-10-CM

## 2024-12-05 PROCEDURE — 1101F PT FALLS ASSESS-DOCD LE1/YR: CPT | Mod: CPTII,S$GLB,, | Performed by: PODIATRIST

## 2024-12-05 PROCEDURE — 1160F RVW MEDS BY RX/DR IN RCRD: CPT | Mod: CPTII,S$GLB,, | Performed by: PODIATRIST

## 2024-12-05 PROCEDURE — 99999 PR PBB SHADOW E&M-EST. PATIENT-LVL III: CPT | Mod: PBBFAC,,, | Performed by: PODIATRIST

## 2024-12-05 PROCEDURE — 99213 OFFICE O/P EST LOW 20 MIN: CPT | Mod: S$GLB,,, | Performed by: PODIATRIST

## 2024-12-05 PROCEDURE — 1125F AMNT PAIN NOTED PAIN PRSNT: CPT | Mod: CPTII,S$GLB,, | Performed by: PODIATRIST

## 2024-12-05 PROCEDURE — 3288F FALL RISK ASSESSMENT DOCD: CPT | Mod: CPTII,S$GLB,, | Performed by: PODIATRIST

## 2024-12-05 PROCEDURE — 1159F MED LIST DOCD IN RCRD: CPT | Mod: CPTII,S$GLB,, | Performed by: PODIATRIST

## 2024-12-05 NOTE — PROGRESS NOTES
Subjective:       Patient ID: Ezequiel Vegas is a 79 y.o. female.    Chief Complaint: Foot Ulcer (Patient continues with ulcer to right second medial toe 8/10 pain at present to area .)      HPI: Patient presents to the office today to follow-up on to the medial aspect of the right 2nd toe.  Recently arterial studies completed.  Continues to follow with Dr. Cai with Interventional Cardiology.  States 8/10 pain the 2nd digit This patient does have a PMHx. of Peripheral Angiopathy and Peripheral Neuropathy. Patient does follow with Primary Care for management of comorbid states. This patient's PMD is Uri Matthews MD.     Review of patient's allergies indicates:   Allergen Reactions    Penicillins      Other reaction(s): Itching       Past Medical History:   Diagnosis Date    Allergy     Cataract     Gout     PVD (peripheral vascular disease) 04/28/2023    Skin ulcer of toe of right foot with fat layer exposed 11/13/2024       Family History   Problem Relation Name Age of Onset    Cervical cancer Mother      Glaucoma Mother      Esophageal cancer Mother      Hypertension Mother      No Known Problems Father      Prostate cancer Maternal Uncle      Lupus Other Niece     Kidney disease Other Niece     Stroke Neg Hx      Diabetes Neg Hx         Social History     Socioeconomic History    Marital status:     Number of children: 0   Tobacco Use    Smoking status: Former     Current packs/day: 0.50     Types: Cigarettes    Smokeless tobacco: Former    Tobacco comments:     occasionally   Substance and Sexual Activity    Alcohol use: Not Currently    Drug use: No    Sexual activity: Never     Social Drivers of Health     Financial Resource Strain: Low Risk  (7/21/2024)    Overall Financial Resource Strain (CARDIA)     Difficulty of Paying Living Expenses: Not hard at all   Food Insecurity: No Food Insecurity (7/21/2024)    Hunger Vital Sign     Worried About Running Out of Food in the Last Year:  Never true     Ran Out of Food in the Last Year: Never true   Transportation Needs: No Transportation Needs (3/31/2023)    Received from [facility] and Its Subsidiaries and Affiliates    PRAPARE - Transportation     Lack of Transportation (Medical): No     Lack of Transportation (Non-Medical): No   Physical Activity: Unknown (7/21/2024)    Exercise Vital Sign     Days of Exercise per Week: 2 days   Stress: No Stress Concern Present (7/21/2024)    [employer] [place] [employer] - Occupational Stress Questionnaire     Feeling of Stress : Not at all   Housing Stability: Unknown (7/21/2024)    Housing Stability Vital Sign     Unable to Pay for Housing in the Last Year: No       Past Surgical History:   Procedure Laterality Date    CATARACT EXTRACTION Right 05/10/2018    EYE SURGERY      HYSTERECTOMY      NEL/BSO    OOPHORECTOMY      VARICOSE VEIN SURGERY         Review of Systems       Objective:   There were no vitals taken for this visit.    Physical Exam  LOWER EXTREMITY PHYSICAL EXAMINATION    VASCULAR:  The right dorsalis pedis pulse 2/4 and the right posterior tibial pulse 1/4.  The left dorsalis pedis pulse 2/4 and posterior tibial pulse on the left is 1/4.  Capillary refill is intact.  Pedal hair growth decreased.     NEUROLOGY:  Protective sensation is intact with [place] Mick monofilament. Proprioception is intact. Intact sensation to light touch.  Vibratory sensation is diminished to the 1st metatarsal phalangeal joint.     DERMATOLOGY:  Skin is skin in nature.  Superficial ulceration to the medial aspect of the 2nd toe appears to be resolving.  Eschar present with healthy skin underlying.  No evidence of erythema or deep tissue exposure.       ORTHOPEDIC:  No history of amputations.    Assessment:     1. Arterial insufficiency of lower extremity    2. Peripheral arterial disease    3. History of foot ulcer          Plan:     Arterial insufficiency of lower  extremity    Peripheral arterial disease    History of foot ulcer        Thorough discussion is had with the patient today, concerning the diagnosis, its etiology, and the treatment algorithm at present.    Thorough discussion is had with the patient concerning the diagnosis, its etiology, and the treatment algorithm at present. Shoe inspection. Foot Education. Patient reminded of the importance of good nutrition and blood sugar control to help prevent podiatric complications of diabetes. Patient instructed on proper foot hygeine. We discussed wearing proper and supportive shoe gear, daily foot inspections, never walking barefooted or sock footed, never putting sharp instruments to feet which can cause major complications associated with infection, ulcers, lacerations.    Arterial studies TAMMY and arterial duplex reviewed in chart.  Interventional Cardiology would like to monitor at this time      Recommend to continue to watch and monitor the progression of the ulceration present to the medial aspect of the right 2nd toe.  Recommend Betadine dressings, dry sterile gauze, toe spacer.  Will plan to follow-up patient in 2-3 weeks to evaluate progression.      Future Appointments   Date Time Provider Department Center   12/30/2024 11:30 AM Meli Spears DPM [facility]   1/29/2025 11:20 AM Nikos Cai MD [facility]   1/30/2025 11:30 AM Uri Matthews MD [facility]   2/27/2025 11:00 AM Marquise Merrill MD [facility]

## 2024-12-30 ENCOUNTER — OFFICE VISIT (OUTPATIENT)
Dept: PODIATRY | Facility: CLINIC | Age: 79
End: 2024-12-30
Payer: MEDICARE

## 2024-12-30 DIAGNOSIS — I73.9 PERIPHERAL ARTERIAL DISEASE: ICD-10-CM

## 2024-12-30 DIAGNOSIS — Z87.2 HISTORY OF FOOT ULCER: Primary | ICD-10-CM

## 2024-12-30 PROCEDURE — 3288F FALL RISK ASSESSMENT DOCD: CPT | Mod: CPTII,S$GLB,, | Performed by: PODIATRIST

## 2024-12-30 PROCEDURE — 99213 OFFICE O/P EST LOW 20 MIN: CPT | Mod: S$GLB,,, | Performed by: PODIATRIST

## 2024-12-30 PROCEDURE — 1160F RVW MEDS BY RX/DR IN RCRD: CPT | Mod: CPTII,S$GLB,, | Performed by: PODIATRIST

## 2024-12-30 PROCEDURE — 1126F AMNT PAIN NOTED NONE PRSNT: CPT | Mod: CPTII,S$GLB,, | Performed by: PODIATRIST

## 2024-12-30 PROCEDURE — 1159F MED LIST DOCD IN RCRD: CPT | Mod: CPTII,S$GLB,, | Performed by: PODIATRIST

## 2024-12-30 PROCEDURE — 99999 PR PBB SHADOW E&M-EST. PATIENT-LVL III: CPT | Mod: PBBFAC,,, | Performed by: PODIATRIST

## 2024-12-30 PROCEDURE — 1101F PT FALLS ASSESS-DOCD LE1/YR: CPT | Mod: CPTII,S$GLB,, | Performed by: PODIATRIST

## 2024-12-30 NOTE — PROGRESS NOTES
Subjective:       Patient ID: Ezequiel Vegas is a 79 y.o. female.    Chief Complaint: Foot Ulcer (Right 2nd toe: c/o denies pain, pt is nondiabetic, no swelling at present.)    HPI: Patient presents to the office today to follow-up on to the medial aspect of the right 2nd toe.  Doing well and states no pain.  Continues to follow with Dr. Cai with Interventional Cardiology. This patient does have a PMHx. of Peripheral Angiopathy and Peripheral Neuropathy. Patient does follow with Primary Care for management of comorbid states. This patient's PMD is Uri Matthews MD.     Review of patient's allergies indicates:   Allergen Reactions    Penicillins      Other reaction(s): Itching       Past Medical History:   Diagnosis Date    Allergy     Cataract     Gout     PVD (peripheral vascular disease) 04/28/2023    Skin ulcer of toe of right foot with fat layer exposed 11/13/2024       Family History   Problem Relation Name Age of Onset    Cervical cancer Mother      Glaucoma Mother      Esophageal cancer Mother      Hypertension Mother      No Known Problems Father      Prostate cancer Maternal Uncle      Lupus Other Niece     Kidney disease Other Niece     Stroke Neg Hx      Diabetes Neg Hx         Social History     Socioeconomic History    Marital status:     Number of children: 0   Tobacco Use    Smoking status: Former     Current packs/day: 0.50     Types: Cigarettes    Smokeless tobacco: Former    Tobacco comments:     occasionally   Substance and Sexual Activity    Alcohol use: Not Currently    Drug use: No    Sexual activity: Never     Social Drivers of Health     Financial Resource Strain: Low Risk  (7/21/2024)    Overall Financial Resource Strain (CARDIA)     Difficulty of Paying Living Expenses: Not hard at all   Food Insecurity: No Food Insecurity (7/21/2024)    Hunger Vital Sign     Worried About Running Out of Food in the Last Year: Never true     Ran Out of Food in the Last Year: Never  true   Transportation Needs: No Transportation Needs (3/31/2023)    Received from [facility] and Its Subsidiaries and Affiliates    PRAPARE - Transportation     Lack of Transportation (Medical): No     Lack of Transportation (Non-Medical): No   Physical Activity: Unknown (7/21/2024)    Exercise Vital Sign     Days of Exercise per Week: 2 days   Stress: No Stress Concern Present (7/21/2024)    [employer] [place] [employer] - Occupational Stress Questionnaire     Feeling of Stress : Not at all   Housing Stability: Unknown (7/21/2024)    Housing Stability Vital Sign     Unable to Pay for Housing in the Last Year: No       Past Surgical History:   Procedure Laterality Date    CATARACT EXTRACTION Right 05/10/2018    EYE SURGERY      HYSTERECTOMY      NEL/BSO    OOPHORECTOMY      VARICOSE VEIN SURGERY         Review of Systems       Objective:   There were no vitals taken for this visit.    Physical Exam  LOWER EXTREMITY PHYSICAL EXAMINATION    VASCULAR:  The right dorsalis pedis pulse 2/4 and the right posterior tibial pulse 1/4.  The left dorsalis pedis pulse 2/4 and posterior tibial pulse on the left is 1/4.  Capillary refill is intact.  Pedal hair growth decreased.     NEUROLOGY:  Protective sensation is intact with [place] Mick monofilament. Proprioception is intact. Intact sensation to light touch.  Vibratory sensation is diminished to the 1st metatarsal phalangeal joint.     DERMATOLOGY:  Skin is skin in nature.  Ulcer to the medial aspect of the 2nd toe subsequently healed.  No evidence of underlying wound breakdown.  No evidence of erythema or deep tissue exposure.  No evidence of cellulitis    ORTHOPEDIC:  No history of amputations.    Assessment:     1. History of foot ulcer    2. Peripheral arterial disease          Plan:     History of foot ulcer    Peripheral arterial disease        Thorough discussion is had with the patient today, concerning the  diagnosis, its etiology, and the treatment algorithm at present.    Thorough discussion is had with the patient concerning the diagnosis, its etiology, and the treatment algorithm at present. Shoe inspection. Foot Education. Patient reminded of the importance of good nutrition and blood sugar control to help prevent podiatric complications of diabetes. Patient instructed on proper foot hygeine. We discussed wearing proper and supportive shoe gear, daily foot inspections, never walking barefooted or sock footed, never putting sharp instruments to feet which can cause major complications associated with infection, ulcers, lacerations.    Wound appears to be well healed.  Continue to watch and monitor feet daily in check for new developing wounds.    Future Appointments   Date Time Provider Department Center   1/29/2025 11:20 AM Nikos Cai MD [facility]   1/30/2025 11:30 AM Uri Matthews MD [facility]   2/27/2025 11:00 AM [facility]

## 2025-01-29 ENCOUNTER — OFFICE VISIT (OUTPATIENT)
Dept: CARDIOLOGY | Facility: CLINIC | Age: 80
End: 2025-01-29
Payer: COMMERCIAL

## 2025-01-29 VITALS
SYSTOLIC BLOOD PRESSURE: 140 MMHG | DIASTOLIC BLOOD PRESSURE: 80 MMHG | BODY MASS INDEX: 18.45 KG/M2 | WEIGHT: 110.88 LBS | OXYGEN SATURATION: 97 % | HEART RATE: 67 BPM

## 2025-01-29 DIAGNOSIS — D64.9 NORMOCYTIC ANEMIA: ICD-10-CM

## 2025-01-29 DIAGNOSIS — R41.3 MEMORY CHANGES: ICD-10-CM

## 2025-01-29 DIAGNOSIS — R53.81 DEBILITY: ICD-10-CM

## 2025-01-29 DIAGNOSIS — Z79.01 ANTICOAGULATED: ICD-10-CM

## 2025-01-29 DIAGNOSIS — I48.91 ATRIAL FIBRILLATION, UNSPECIFIED TYPE: ICD-10-CM

## 2025-01-29 DIAGNOSIS — Z72.0 TOBACCO USE: ICD-10-CM

## 2025-01-29 DIAGNOSIS — I73.9 PVD (PERIPHERAL VASCULAR DISEASE): ICD-10-CM

## 2025-01-29 DIAGNOSIS — E78.5 DYSLIPIDEMIA: ICD-10-CM

## 2025-01-29 DIAGNOSIS — N18.31 STAGE 3A CHRONIC KIDNEY DISEASE: ICD-10-CM

## 2025-01-29 DIAGNOSIS — I48.0 PAF (PAROXYSMAL ATRIAL FIBRILLATION): ICD-10-CM

## 2025-01-29 DIAGNOSIS — I27.20 PULMONARY HYPERTENSION: ICD-10-CM

## 2025-01-29 DIAGNOSIS — L97.512 SKIN ULCER OF TOE OF RIGHT FOOT WITH FAT LAYER EXPOSED: Primary | ICD-10-CM

## 2025-01-29 PROCEDURE — 1101F PT FALLS ASSESS-DOCD LE1/YR: CPT | Mod: CPTII,S$GLB,, | Performed by: INTERNAL MEDICINE

## 2025-01-29 PROCEDURE — 99214 OFFICE O/P EST MOD 30 MIN: CPT | Mod: S$GLB,,, | Performed by: INTERNAL MEDICINE

## 2025-01-29 PROCEDURE — 3075F SYST BP GE 130 - 139MM HG: CPT | Mod: CPTII,S$GLB,, | Performed by: INTERNAL MEDICINE

## 2025-01-29 PROCEDURE — 3079F DIAST BP 80-89 MM HG: CPT | Mod: CPTII,S$GLB,, | Performed by: INTERNAL MEDICINE

## 2025-01-29 PROCEDURE — 3288F FALL RISK ASSESSMENT DOCD: CPT | Mod: CPTII,S$GLB,, | Performed by: INTERNAL MEDICINE

## 2025-01-29 PROCEDURE — 99999 PR PBB SHADOW E&M-EST. PATIENT-LVL III: CPT | Mod: PBBFAC,,, | Performed by: INTERNAL MEDICINE

## 2025-01-29 PROCEDURE — 1159F MED LIST DOCD IN RCRD: CPT | Mod: CPTII,S$GLB,, | Performed by: INTERNAL MEDICINE

## 2025-01-29 PROCEDURE — 1160F RVW MEDS BY RX/DR IN RCRD: CPT | Mod: CPTII,S$GLB,, | Performed by: INTERNAL MEDICINE

## 2025-01-29 NOTE — PROGRESS NOTES
Subjective:   Patient ID:  Ezequiel Vegas is a 79 y.o. female who presents for follow up of No chief complaint on file.      HPI  DR DIETZ 4/21/2023  The patient came in today for cardiac consult of No chief complaint on file.     Ezequiel Vegas is a 78 y.o. female pt with newly diagnosed AFib, HTN, OA, CKD3, HLD, tobacco use presents for CV eval of Afib     Reason for Hospitalization   78-year-old female with a PMH of HTN and arthritis who presented to the ED earlier this evening after her family noticed she had not been eating or drinking for ~1 week and had some foul-smelling urine. On my exam, there is no family at bedside and the patient is a rather poor historian so history has mainly come from chart review. After noticing the symptoms, the family called EMS. On EMS arrival, her heart rate was in the 180s-200s improved to the 140s with 1 L IVF in route to the ED. On further probing, the patient does admit to cough that has been progressive over the last few days. It is productive of a gray/yellow sputum and associated with some chills. She denies other associated symptoms. No exacerbating or alleviating factors.    Multiple significant lab abnormalities including: Sodium 148, creatinine 1.99 troponin 0.07, lactic acid 8.6 --> 3.1, WBC 20.6. CT head unremarkable. CXR with pneumonia in the left lower lung zone and a small left-sided pleural effusion.    4/1 feeling well; tolerating diet. Worked with PT. No chest pain sob.  helping her with placement.   4/2 feeling ok. Reports toe pain (no ulcer, no hx injury). No chest pain sob.   4/3 reports toe pain still significant. Xr shows hallus valgus. Orthopedics consulted and recommended conservative management, post op boot. No chest pain sob.   4/4 feeling ok. No chest pain sob.   4/5 no chest pain ob. Feeling well. Working with PT. Motivated to continue working in bed when PT is not around.   4/6 no issue. Now family wants to go home instead of  [facility] placement.   4/7 received most equipment. Family reports they can manage at home until wheelchair arrives next day. No chest pain sob. Feeling well. Exercising in bed.        4/21/23  Pt was admitted for sepsis sec to [facility] went into Afib sec to sepsis/[facility], she had cardiology consulted and started on Eliqius and BB.      She has been living with niece now, is gaining some weight. She weighed 99 lbs at hospital.   BP today low 104/62, HR 58. BMI 18 - 106 lbs.      Patient feels  no leg swelling, no PND, no dizziness, no syncope, no CNS symptoms.     Patient is compliant with medications.     March 2023   CONCLUSIONS:   1. The left ventricle is small. Normal left ventricular systolic function. LVEF 55 - 65%.   Normal diastolic function.   2. Normal right ventricular size. Severe right ventricular hypokinesis.   3. Mild mitral valve regurgitation. Moderate mitral annular calcification.   4. Mild to moderate aortic valve regurgitation.   5. Mild to moderate tricuspid valve regurgitation.       4/28/2023   REFERRED FROM DR BUSCH for leg pain   She has been complaining of episodic pain in feet knees and sometimes in groin. The legs are cold she has a pinch in groin legs and feet at nite. Has no back pain she quits smoking 1 month.she can walk w/o any calf claudication the pain is intermittent. Has no discoloration in feet or numbness . Has toe pain and ankle  she had toe nail trimmed helped the pain. She had arterial study done showed bilateral severe sfa disease with monophasic flow distally.         7/28/2023  Has gained weight not smoking not walking much or ambuilating. Denies claudication no discoloration or ulceration in feet. Tolerated pletal well.     11/13/2024 needs dental extraction. Denies any new symptoms. She walks using a walker no new complaints has toe pain .still smoking. Has a toe ulcer on second right toe clean base painful.     1/29/2025   Ulcer healed no pain in foot. Still smoking  tolerated meds well no bleeding   Vascular studies showed marginal healing potential with severe pvd on the right  Past Medical History:   Diagnosis Date    Allergy     Cataract     Gout     PVD (peripheral vascular disease) 04/28/2023    Skin ulcer of toe of right foot with fat layer exposed 11/13/2024       Past Surgical History:   Procedure Laterality Date    CATARACT EXTRACTION Right 05/10/2018    EYE SURGERY      HYSTERECTOMY      NEL/BSO    OOPHORECTOMY      VARICOSE VEIN SURGERY         Social History     Tobacco Use    Smoking status: Former     Current packs/day: 0.50     Types: Cigarettes    Smokeless tobacco: Former    Tobacco comments:     occasionally   Substance Use Topics    Alcohol use: Not Currently    Drug use: No       Family History   Problem Relation Name Age of Onset    Cervical cancer Mother      Glaucoma Mother      Esophageal cancer Mother      Hypertension Mother      No Known Problems Father      Prostate cancer Maternal Uncle      Lupus Other Niece     Kidney disease Other Niece     Stroke Neg Hx      Diabetes Neg Hx         Current Outpatient Medications   Medication Sig    amLODIPine (NORVASC) 5 MG tablet Take 1 tablet (5 mg total) by mouth once daily.    benzonatate (TESSALON) 200 MG capsule Take 1 capsule (200 mg total) by mouth 3 (three) times daily as needed for Cough.    cilostazoL (PLETAL) 50 MG Tab Take 1 tablet (50 mg total) by mouth 2 (two) times daily.    diclofenac sodium (VOLTAREN) 1 % Gel Apply 2 g topically 2 (two) times daily.    ELIQUIS 2.5 mg Tab Take 1 tablet (2.5 mg total) by mouth 2 (two) times daily.    metoprolol succinate (TOPROL-XL) 50 MG 24 hr tablet Take 1 tablet (50 mg total) by mouth once daily.    pravastatin (PRAVACHOL) 40 MG tablet Take 1 tablet (40 mg total) by mouth once daily.     No current facility-administered medications for this visit.     Current Outpatient Medications on File Prior to Visit   Medication Sig    amLODIPine (NORVASC) 5 MG tablet  Take 1 tablet (5 mg total) by mouth once daily.    benzonatate (TESSALON) 200 MG capsule Take 1 capsule (200 mg total) by mouth 3 (three) times daily as needed for Cough.    cilostazoL (PLETAL) 50 MG Tab Take 1 tablet (50 mg total) by mouth 2 (two) times daily.    diclofenac sodium (VOLTAREN) 1 % Gel Apply 2 g topically 2 (two) times daily.    ELIQUIS 2.5 mg Tab Take 1 tablet (2.5 mg total) by mouth 2 (two) times daily.    metoprolol succinate (TOPROL-XL) 50 MG 24 hr tablet Take 1 tablet (50 mg total) by mouth once daily.    pravastatin (PRAVACHOL) 40 MG tablet Take 1 tablet (40 mg total) by mouth once daily.     No current facility-administered medications on file prior to visit.     Review of patient's allergies indicates:   Allergen Reactions    Penicillins      Other reaction(s): Itching      Review of Systems   Constitutional: Negative for diaphoresis, malaise/fatigue and weight gain.   HENT:  Negative for hoarse voice.    Eyes:  Negative for double vision and visual disturbance.   Cardiovascular:  Negative for chest pain, claudication, cyanosis, dyspnea on exertion, irregular heartbeat, leg swelling, near-syncope, orthopnea, palpitations, paroxysmal nocturnal dyspnea and syncope.   Respiratory:  Negative for cough, hemoptysis, shortness of breath and snoring.    Hematologic/Lymphatic: Negative for bleeding problem. Does not bruise/bleed easily.   Skin:  Positive for color change and poor wound healing.   Musculoskeletal:  Negative for muscle cramps, muscle weakness and myalgias.   Gastrointestinal:  Negative for bloating, abdominal pain, change in bowel habit, diarrhea, heartburn, hematemesis, hematochezia, melena and nausea.   Neurological:  Negative for excessive daytime sleepiness, dizziness, headaches, light-headedness, loss of balance, numbness and weakness.   Psychiatric/Behavioral:  Negative for memory loss. The patient does not have insomnia.    Allergic/Immunologic: Negative for hives.        Objective:   Physical Exam  Vitals:    01/29/25 1137 01/29/25 1139   BP: 138/80 (!) 140/80   BP Location: Left arm Right arm   Patient Position: Sitting Sitting   Pulse: 67    SpO2: 97%    Weight: 50.3 kg (110 lb 14.3 oz)    Constitutional:       General: She is not in acute distress.     Appearance: Normal appearance. She is well-developed. She is not ill-appearing.   HENT:      Head: Normocephalic and atraumatic.   Eyes:      General: No scleral icterus.     Pupils: Pupils are equal, round, and reactive to light.   Neck:      Thyroid: No thyromegaly.      Vascular: Normal carotid pulses. No carotid bruit, hepatojugular reflux or JVD.      Trachea: No tracheal deviation.   Cardiovascular:      Rate and Rhythm: Normal rate and regular rhythm.      Pulses:           Carotid pulses are 2+ on the right side and 2+ on the left side.       Radial pulses are 2+ on the right side and 2+ on the left side.        Femoral pulses are 2+ on the right side and 2+ on the left side.       Popliteal pulses are 0 on the right side and 0 on the left side.        Dorsalis pedis pulses are 0 on the right side and 0 on the left side.        Posterior tibial pulses are 0 on the right side and 0 on the left side.      Heart sounds: Normal heart sounds. No murmur heard.     No friction rub. No gallop.   Pulmonary:      Effort: Pulmonary effort is normal. No respiratory distress.      Breath sounds: Normal breath sounds. No wheezing, rhonchi or rales.   Chest:      Chest wall: No tenderness.   Abdominal:      General: Bowel sounds are normal. There is no abdominal bruit.      Palpations: Abdomen is soft. There is no hepatomegaly or pulsatile mass.      Tenderness: There is no abdominal tenderness.   Musculoskeletal:      Right shoulder: No deformity.      Cervical back: Normal range of motion and neck supple.   Skin:     General: Skin is warm and dry.      Findings: No erythema or rash.      Nails: There is no clubbing toe ulcer  "healed. .   Neurological:      Mental Status: She is alert and oriented to person, place, and time.      Cranial Nerves: No cranial nerve deficit.      Coordination: Coordination normal.   Psychiatric:         Speech: Speech normal.         Behavior: Behavior normal.         Lab Results   Component Value Date    CHOL 153 09/23/2024    CHOL 211 (H) 10/13/2022    CHOL 225 (H) 12/11/2020      Body mass index is 18.45 kg/m².   Lab Results   Component Value Date    HGBA1C 5.2 12/11/2020      BMP  Lab Results   Component Value Date     09/23/2024    K 4.3 09/23/2024     09/23/2024    CO2 24 09/23/2024    BUN 18 09/23/2024    CREATININE 1.2 09/23/2024    CALCIUM 9.6 09/23/2024    ANIONGAP 11 09/23/2024    EGFRNORACEVR 45.5 (A) 09/23/2024      Lab Results   Component Value Date    HDL 58 09/23/2024    HDL 71 10/13/2022    HDL 76 (H) 12/11/2020     Lab Results   Component Value Date    LDLCALC 83.8 09/23/2024    LDLCALC 125.2 10/13/2022    LDLCALC 131.0 12/11/2020     Lab Results   Component Value Date    TRIG 56 09/23/2024    TRIG 74 10/13/2022    TRIG 90 12/11/2020     Lab Results   Component Value Date    CHOLHDL 37.9 09/23/2024    CHOLHDL 33.6 10/13/2022    CHOLHDL 33.8 12/11/2020       Chemistry        Component Value Date/Time     09/23/2024 1214    K 4.3 09/23/2024 1214     09/23/2024 1214    CO2 24 09/23/2024 1214    BUN 18 09/23/2024 1214    CREATININE 1.2 09/23/2024 1214    GLU 84 09/23/2024 1214        Component Value Date/Time    CALCIUM 9.6 09/23/2024 1214    ALKPHOS 74 09/23/2024 1214    AST 16 09/23/2024 1214    ALT 7 (L) 09/23/2024 1214    BILITOT 0.5 09/23/2024 1214    ESTGFRAFRICA 82 04/04/2023 0505    ESTGFRAFRICA 56.0 (A) 02/08/2021 1500    EGFRNONAA 48.5 (A) 02/08/2021 1500          Lab Results   Component Value Date    TSH 0.747 09/23/2024     No results found for: "INR", "PROTIME"  Lab Results   Component Value Date    WBC 8.79 09/23/2024    HGB 11.7 (L) 09/23/2024    HCT 38.8 " 09/23/2024    MCV 96 09/23/2024     09/23/2024     BMP  Sodium   Date Value Ref Range Status   09/23/2024 143 136 - 145 mmol/L Final     Potassium   Date Value Ref Range Status   09/23/2024 4.3 3.5 - 5.1 mmol/L Final     Chloride   Date Value Ref Range Status   09/23/2024 108 95 - 110 mmol/L Final     CO2   Date Value Ref Range Status   09/23/2024 24 23 - 29 mmol/L Final     BUN   Date Value Ref Range Status   09/23/2024 18 8 - 23 mg/dL Final     Creatinine   Date Value Ref Range Status   09/23/2024 1.2 0.5 - 1.4 mg/dL Final     Calcium   Date Value Ref Range Status   09/23/2024 9.6 8.7 - 10.5 mg/dL Final     Anion Gap   Date Value Ref Range Status   09/23/2024 11 8 - 16 mmol/L Final     eGFR if    Date Value Ref Range Status   02/08/2021 56.0 (A) >60 mL/min/1.73 m^2 Final     eGFR    Date Value Ref Range Status   04/04/2023 82 mL/min/1.73mSq Final     Comment:     In accordance with NKF-ASN Task Force recommendation, calculation based on the Chronic Kidney Disease Epidemiology Collaboration (CKD-EPI) equation without adjustment for race. eGFR adjusted for gender and age and calculated in ml/min/1.73mSquared. eGFR cannot be calculated if patient is under 18 years of age.     Reference Range:   >= 60 ml/min/1.73mSquared.     eGFR if non    Date Value Ref Range Status   02/08/2021 48.5 (A) >60 mL/min/1.73 m^2 Final     Comment:     Calculation used to obtain the estimated glomerular filtration  rate (eGFR) is the CKD-EPI equation.        CrCl cannot be calculated (Patient's most recent lab result is older than the maximum 7 days allowed.).  Findings    TAMMY The right resting TAMMY reduction is severely decreased.   The right ankle [DT] artery has monophasic flow.   The right ankle [DP] artery has monophasic flow.   The left resting TAMMY reduction is moderately decreased.   The left ankle [PT] artery has monophasic flow.  The left ankle [DP] artery has monophasic flow.          Medication Changes      None  Medication List  US Measurements - TAMMY    Right   Right arm  mmHg         Right posterior tibial 89 mmHg         Right dorsalis pedis 77 mmHg         Right TAMMY 0.55         Right toe pressure 55 mmHg         Right TBI 0.34          Left   Left arm  mmHg         Left posterior tibial 116 mmHg         Left dorsalis pedis 104 mmHg         Left TAMMY 0.72         Left toe pressure 53 mmHg         Left TBI 0.33              Interpretation Summary  Show Result Comparison     RLE midSFA occlusion with collateral circulation on distal SFA and distal monophasic waveform. TAMMY 0.55, suggesting severe PAD.    LLE ostailSFA 76 to 99% lesion with distal monophasic waveform. TAMMY 0.72, suggesting moderate PAD.  Assessment:     1. Skin ulcer of toe of right foot with fat layer exposed    2. Pulmonary hypertension    3. PAF (paroxysmal atrial fibrillation)    4. Stage 3a chronic kidney disease    5. Dyslipidemia    6. Memory changes    7. Tobacco use    8. Atrial fibrillation, unspecified type    9. Debility    10. PVD (peripheral vascular disease)    11. Anticoagulated    12. Normocytic anemia      Tobacco use counseled about smoking  Toe ulcer with pvd painful clean margin has healed   tammy and arterial duplex she has marginal healing potential .counsled about appropriate foot wear needs podiatry eval. And nail care  Pvd she is on pletal counseled about smoking cessation   Hlp on statins on target counseled about compliance   Htn controlled low slat diet continue same  Paf in sr on eliquis stable no bleeding continue same.   Ckd stage 3 stable will monitor renal function  Needs dental extraction  Needs to stop eliquis 2 days before  and pletal 5 days before.  No epinephrine with lidocaine.  She is at moderate risk.     Plan:   Asper above   Stop SMOKING   CONTINUE SAME MEDS   KEEP PODIATRY F/U  6 MONTHS F/U LIPID CMP

## 2025-02-05 DIAGNOSIS — Z78.0 MENOPAUSE: ICD-10-CM

## 2025-08-27 ENCOUNTER — LAB VISIT (OUTPATIENT)
Dept: LAB | Facility: HOSPITAL | Age: 80
End: 2025-08-27
Attending: INTERNAL MEDICINE
Payer: COMMERCIAL

## 2025-08-27 DIAGNOSIS — I73.9 PVD (PERIPHERAL VASCULAR DISEASE): ICD-10-CM

## 2025-08-27 LAB
ALBUMIN SERPL BCP-MCNC: 3.5 G/DL (ref 3.5–5.2)
ALP SERPL-CCNC: 74 UNIT/L (ref 40–150)
ALT SERPL W/O P-5'-P-CCNC: 8 UNIT/L (ref 0–55)
ANION GAP (OHS): 8 MMOL/L (ref 8–16)
AST SERPL-CCNC: 20 UNIT/L (ref 0–50)
BILIRUB SERPL-MCNC: 0.3 MG/DL (ref 0.1–1)
BUN SERPL-MCNC: 24 MG/DL (ref 8–23)
CALCIUM SERPL-MCNC: 9.3 MG/DL (ref 8.7–10.5)
CHLORIDE SERPL-SCNC: 107 MMOL/L (ref 95–110)
CHOLEST SERPL-MCNC: 124 MG/DL (ref 120–199)
CHOLEST/HDLC SERPL: 2.3 {RATIO} (ref 2–5)
CO2 SERPL-SCNC: 24 MMOL/L (ref 23–29)
CREAT SERPL-MCNC: 1.6 MG/DL (ref 0.5–1.4)
GFR SERPLBLD CREATININE-BSD FMLA CKD-EPI: 32 ML/MIN/1.73/M2
GLUCOSE SERPL-MCNC: 83 MG/DL (ref 70–110)
HDLC SERPL-MCNC: 54 MG/DL (ref 40–75)
HDLC SERPL: 43.5 % (ref 20–50)
LDLC SERPL CALC-MCNC: 60.4 MG/DL (ref 63–159)
NONHDLC SERPL-MCNC: 70 MG/DL
POTASSIUM SERPL-SCNC: 3.5 MMOL/L (ref 3.5–5.1)
PROT SERPL-MCNC: 7.4 GM/DL (ref 6–8.4)
SODIUM SERPL-SCNC: 139 MMOL/L (ref 136–145)
TRIGL SERPL-MCNC: 48 MG/DL (ref 30–150)

## 2025-08-27 PROCEDURE — 36415 COLL VENOUS BLD VENIPUNCTURE: CPT | Mod: PO

## 2025-08-27 PROCEDURE — 80053 COMPREHEN METABOLIC PANEL: CPT

## 2025-08-27 PROCEDURE — 82465 ASSAY BLD/SERUM CHOLESTEROL: CPT

## 2025-09-03 DIAGNOSIS — M25.561 PAIN IN BOTH KNEES, UNSPECIFIED CHRONICITY: Primary | ICD-10-CM

## 2025-09-03 DIAGNOSIS — M25.562 PAIN IN BOTH KNEES, UNSPECIFIED CHRONICITY: Primary | ICD-10-CM
